# Patient Record
Sex: FEMALE | Race: WHITE | Employment: FULL TIME
[De-identification: names, ages, dates, MRNs, and addresses within clinical notes are randomized per-mention and may not be internally consistent; named-entity substitution may affect disease eponyms.]

---

## 2017-12-03 ENCOUNTER — HEALTH MAINTENANCE LETTER (OUTPATIENT)
Age: 32
End: 2017-12-03

## 2019-05-20 ENCOUNTER — OFFICE VISIT (OUTPATIENT)
Dept: ENDOCRINOLOGY | Facility: CLINIC | Age: 34
End: 2019-05-20
Payer: COMMERCIAL

## 2019-05-20 VITALS
WEIGHT: 182 LBS | TEMPERATURE: 98.5 F | SYSTOLIC BLOOD PRESSURE: 114 MMHG | HEIGHT: 68 IN | BODY MASS INDEX: 27.58 KG/M2 | DIASTOLIC BLOOD PRESSURE: 76 MMHG | RESPIRATION RATE: 20 BRPM | HEART RATE: 76 BPM

## 2019-05-20 DIAGNOSIS — E03.9 HYPOTHYROIDISM, UNSPECIFIED TYPE: Primary | ICD-10-CM

## 2019-05-20 LAB
T4 FREE SERPL-MCNC: 1.08 NG/DL (ref 0.76–1.46)
TSH SERPL DL<=0.005 MIU/L-ACNC: 2.14 MU/L (ref 0.4–4)

## 2019-05-20 PROCEDURE — 99204 OFFICE O/P NEW MOD 45 MIN: CPT | Performed by: INTERNAL MEDICINE

## 2019-05-20 PROCEDURE — 86376 MICROSOMAL ANTIBODY EACH: CPT | Performed by: INTERNAL MEDICINE

## 2019-05-20 PROCEDURE — 36415 COLL VENOUS BLD VENIPUNCTURE: CPT | Performed by: INTERNAL MEDICINE

## 2019-05-20 PROCEDURE — 84439 ASSAY OF FREE THYROXINE: CPT | Performed by: INTERNAL MEDICINE

## 2019-05-20 PROCEDURE — 84443 ASSAY THYROID STIM HORMONE: CPT | Performed by: INTERNAL MEDICINE

## 2019-05-20 ASSESSMENT — MIFFLIN-ST. JEOR: SCORE: 1574.05

## 2019-05-20 NOTE — LETTER
5/20/2019         RE: Linnea M Schirmer  [address] [facility] [address]        Dear Colleague,    Thank you for referring your patient, Linnea M Schirmer, to the [facility]. Please see a copy of my visit note below.    ENDOCRINOLOGY CLINIC NOTE:    Name: Linnea M Schirmer  Self referral for Hypothyroidism.  HPI:  Linnea M Schirmer is a 34 year old female who presents for the evaluation of :    #1 Hypothyroidism.  Was diagnosed with hypothyroidism at age 30.   Per her report it was subclinical hypothyroidism at that time.  But based on her symptoms she was started on levothyroxine.  Had fatigue at that time. Also had iron deficiency at that time.  Reports that iron deficiency has resolved.    After starting levothyroxine she was feeling Ok.  Was pregnant in 2016 and was on levothyroxine during pregnancy.    Off of levothyroxine X 2 years.  ( she reports that she ran out of medication and clinic refused to refill)   last labs checked about 1 year back showed a thyroid Hormones in normal range.    Feeling tired all the time X since 2016 that will be very  Is more sleepy.  Has to take a nap in day.  + hair loss X 1 month.    Palpitations:  No  Changes to hair or skin: Yes: + hair loss  Diarrhea/Constipation:No  Changes in menses: No. Not planning pregnancy.  Dysphagia or Shortness of breath:No  Tremors:No  Difficulty sleeping:No  Changes in weight: + wt gain but trying to loose wt. Lost about 15 lbs since dec 2018- 4/2019  Heat or cold intolerance: + cold intolerance X 2 months  History of Lithium or Amiodarone use:No  Head or neck surgery/radiation:No  IV Contrast: No  Family History of Thyroid Problems: mgm- thyroid. P.aunt- thyroid surgery.  PMH/PSH:  Past Medical History:   Diagnosis Date     Migraines      Past Surgical History:   Procedure Laterality Date     CYSTECTOMY OVARIAN BENIGN       Family Hx:  Family History   Problem Relation Age of Onset     Diabetes Maternal Grandmother       "Diabetes Maternal Grandfather      Diabetes Paternal Grandfather      Social Hx:  Social History     Socioeconomic History     Marital status: Single     Spouse name: Not on file     Number of children: Not on file     Years of education: Not on file     Highest education level: Not on file   Occupational History     Not on file   Social Needs     Financial resource strain: Not on file     Food insecurity:     Worry: Not on file     Inability: Not on file     Transportation needs:     Medical: Not on file     Non-medical: Not on file   Tobacco Use     Smoking status: Never Smoker     Smokeless tobacco: Never Used   Substance and Sexual Activity     Alcohol use: Yes     Comment: occassional     Drug use: No     Sexual activity: Yes     Partners: Male   Lifestyle     Physical activity:     Days per week: Not on file     Minutes per session: Not on file     Stress: Not on file   Relationships     Social connections:     Talks on phone: Not on file     Gets together: Not on file     Attends [employer] service: Not on file     Active member of club or organization: Not on file     Attends meetings of clubs or organizations: Not on file     Relationship status: Not on file     Intimate partner violence:     Fear of current or ex partner: Not on file     Emotionally abused: Not on file     Physically abused: Not on file     Forced sexual activity: Not on file   Other Topics Concern     Not on file   Social History Narrative     Not on file          MEDICATIONS:  has a current medication list which includes the following prescription(s): sumatriptan and levothyroxine sodium.    ROS   ROS: 10 point ROS neg other than the symptoms noted above in the HPI.    Physical Exam   VS: /76   Pulse 76   Temp 98.5  F (36.9  C) (Oral)   Resp 20   Ht 1.727 m (5' 8\")   Wt 82.6 kg (182 lb)   BMI 27.67 kg/m     GENERAL: AXOX3, NAD, well dressed, answering questions appropriately, appears stated age.  HEENT: OP clear, no LAD, no " TM, non-tender, no exopthalmous, no proptosis, EOMI, no lig lag, no retraction  NECK: Thyroid normal in size, non tender, no nodules were palpated.  CV: RRR, no rubs, gallops, no murmurs  LUNGS: CTAB, no wheezes, rales, or ronchi  ABDOMEN: soft, nontender, nondistended, +BS, no organomegaly  EXTREMITIES: no edema, +pulses, no rashes, no lesions  NEUROLOGY: CN grossly intact,  + DTR upper and lower extremity, no tremors  MSK: grossly intact  SKIN: no rashes, no lesions    LABS:  No recent labs to review.    All pertinent notes, labs, and images personally reviewed by me.     A/P  Ms.Linnea M Schirmer is a 34 year old here for the evaluation of hypothyroidism:    #1 Hypothyroidism. Differential includes: autoimmune disease (Hashimoto's thyroiditis), treatment for hyperthyroidism, radiation therapy, thyroid surgery, medications, congenital disease, pituitary disorder, pregnancy, and iodine deficiency.  Persons with Hashimoto's thyroiditis have serum antibodies reacting with TG, TPO, and against an unidentified protein present in colloid.   She is off of levothyroxine for last 2 years  Labs checked 1 year back are in acceptable range per her report  She was started on levothyroxine for subclinical hypothyroidism at age 13  Not planning pregnancy at this time  Has some concerns like chronic fatigue and cold intolerance  Plan:  Discussed diagnosis, pathophysiology, management and treatment options of condition with pt.  I discussed with patient that symptoms like chronic fatigue can be multifactorial  I recommend to repeat thyroid function test and screen for Hashimoto with labs today  Consider thyroid hormone replacement based on that  She is not planning pregnancy at this time but I discussed with patient importance of euthyroidism prior to conception and close monitoring of labs during pregnancy.  Plan: TSH, T4 free, Thyroid peroxidase antibody,                 Standard treatment for hypothyroidism involves daily use  of the synthetic thyroid hormone levothyroxine (Levothroid, Synthroid, others).  The dosage of thyroxine should normally be that required to bring the serum TSH level to the low normal range, such as 0.3 - 1 uU/ml. This is typically achieved with 1 ug L-T4/lb body weight/day, ranges from 75 - 125 ug/day in women, and 125 - 200 ug/day in men. Once thyroxine treatment is initiated, it is required indefinitely in most patients.     Symptoms should improve one to two weeks after starting treatment. Treatment with levothyroxine is usually lifelong.  Doseage may need to be adjusted based on body weight, medications, or pregnancy.  To determine the right dosage of levothyroxine initially we will repeat TSH and free T4 after two months.     Excessive amounts of the hormone can cause side effects, such as: Increased appetite, insomnia, heart palpitations, and shakiness.  Patients with CAD will be started on a lower dose.  Levothyroxine causes virtually no side effects when used in the appropriate dose.    In patients with childbearing age precaution should be taking regarding hypothyroidism. Hypothyroid woman are more likely to experience infertility, and they have an increase. Balance and portion, anemia, and gestational hypertension, placental abruption and postpartum hemorrhage.      Follow-up:  Based on labs.    Katherine Avila MD  Endocrinology  [facility]  CC: Eve Apodaca    More than 50% of face to face time spent with Ms. Schirmer on counseling / coordinating her care.    All questions were answered.  The patient indicates understanding of the above issues and agrees with the plan set forth.     Addendum to above note and clinic visit:    Labs reviewed.    See result note/telephone encounter.            Again, thank you for allowing me to participate in the care of your patient.        Sincerely,        Katherine Avila MD

## 2019-05-20 NOTE — PROGRESS NOTES
ENDOCRINOLOGY CLINIC NOTE:    Name: Linnea M Schirmer  Self referral for Hypothyroidism.  HPI:  Linnea M Schirmer is a 34 year old female who presents for the evaluation of :    #1 Hypothyroidism.  Was diagnosed with hypothyroidism at age 30.   Per her report it was subclinical hypothyroidism at that time.  But based on her symptoms she was started on levothyroxine.  Had fatigue at that time. Also had iron deficiency at that time.  Reports that iron deficiency has resolved.    After starting levothyroxine she was feeling Ok.  Was pregnant in 2016 and was on levothyroxine during pregnancy.    Off of levothyroxine X 2 years.  ( she reports that she ran out of medication and clinic refused to refill)   last labs checked about 1 year back showed a thyroid Hormones in normal range.    Feeling tired all the time X since 2016 that will be very  Is more sleepy.  Has to take a nap in day.  + hair loss X 1 month.    Palpitations:  No  Changes to hair or skin: Yes: + hair loss  Diarrhea/Constipation:No  Changes in menses: No. Not planning pregnancy.  Dysphagia or Shortness of breath:No  Tremors:No  Difficulty sleeping:No  Changes in weight: + wt gain but trying to loose wt. Lost about 15 lbs since dec 2018- 4/2019  Heat or cold intolerance: + cold intolerance X 2 months  History of Lithium or Amiodarone use:No  Head or neck surgery/radiation:No  IV Contrast: No  Family History of Thyroid Problems: mgm- thyroid. P.aunt- thyroid surgery.  PMH/PSH:  Past Medical History:   Diagnosis Date     Migraines      Past Surgical History:   Procedure Laterality Date     CYSTECTOMY OVARIAN BENIGN       Family Hx:  Family History   Problem Relation Age of Onset     Diabetes Maternal Grandmother      Diabetes Maternal Grandfather      Diabetes Paternal Grandfather      Social Hx:  Social History     Socioeconomic History     Marital status: Single     Spouse name: Not on file     Number of children: Not on file     Years of education: Not on  "file     Highest education level: Not on file   Occupational History     Not on file   Social Needs     Financial resource strain: Not on file     Food insecurity:     Worry: Not on file     Inability: Not on file     Transportation needs:     Medical: Not on file     Non-medical: Not on file   Tobacco Use     Smoking status: Never Smoker     Smokeless tobacco: Never Used   Substance and Sexual Activity     Alcohol use: Yes     Comment: occassional     Drug use: No     Sexual activity: Yes     Partners: Male   Lifestyle     Physical activity:     Days per week: Not on file     Minutes per session: Not on file     Stress: Not on file   Relationships     Social connections:     Talks on phone: Not on file     Gets together: Not on file     Attends [employer] service: Not on file     Active member of club or organization: Not on file     Attends meetings of clubs or organizations: Not on file     Relationship status: Not on file     Intimate partner violence:     Fear of current or ex partner: Not on file     Emotionally abused: Not on file     Physically abused: Not on file     Forced sexual activity: Not on file   Other Topics Concern     Not on file   Social History Narrative     Not on file          MEDICATIONS:  has a current medication list which includes the following prescription(s): sumatriptan and levothyroxine sodium.    ROS   ROS: 10 point ROS neg other than the symptoms noted above in the HPI.    Physical Exam   VS: /76   Pulse 76   Temp 98.5  F (36.9  C) (Oral)   Resp 20   Ht 1.727 m (5' 8\")   Wt 82.6 kg (182 lb)   BMI 27.67 kg/m    GENERAL: AXOX3, NAD, well dressed, answering questions appropriately, appears stated age.  HEENT: OP clear, no LAD, no TM, non-tender, no exopthalmous, no proptosis, EOMI, no lig lag, no retraction  NECK: Thyroid normal in size, non tender, no nodules were palpated.  CV: RRR, no rubs, gallops, no murmurs  LUNGS: CTAB, no wheezes, rales, or ronchi  ABDOMEN: soft, " nontender, nondistended, +BS, no organomegaly  EXTREMITIES: no edema, +pulses, no rashes, no lesions  NEUROLOGY: CN grossly intact,  + DTR upper and lower extremity, no tremors  MSK: grossly intact  SKIN: no rashes, no lesions    LABS:  No recent labs to review.    All pertinent notes, labs, and images personally reviewed by me.     A/P  Ms.Linnea M Schirmer is a 34 year old here for the evaluation of hypothyroidism:    #1 Hypothyroidism. Differential includes: autoimmune disease (Hashimoto's thyroiditis), treatment for hyperthyroidism, radiation therapy, thyroid surgery, medications, congenital disease, pituitary disorder, pregnancy, and iodine deficiency.  Persons with Hashimoto's thyroiditis have serum antibodies reacting with TG, TPO, and against an unidentified protein present in colloid.   She is off of levothyroxine for last 2 years  Labs checked 1 year back are in acceptable range per her report  She was started on levothyroxine for subclinical hypothyroidism at age 13  Not planning pregnancy at this time  Has some concerns like chronic fatigue and cold intolerance  Plan:  Discussed diagnosis, pathophysiology, management and treatment options of condition with pt.  I discussed with patient that symptoms like chronic fatigue can be multifactorial  I recommend to repeat thyroid function test and screen for Hashimoto with labs today  Consider thyroid hormone replacement based on that  She is not planning pregnancy at this time but I discussed with patient importance of euthyroidism prior to conception and close monitoring of labs during pregnancy.  Plan: TSH, T4 free, Thyroid peroxidase antibody,                 Standard treatment for hypothyroidism involves daily use of the synthetic thyroid hormone levothyroxine (Levothroid, Synthroid, others).  The dosage of thyroxine should normally be that required to bring the serum TSH level to the low normal range, such as 0.3 - 1 uU/ml. This is typically achieved with  1 ug L-T4/lb body weight/day, ranges from 75 - 125 ug/day in women, and 125 - 200 ug/day in men. Once thyroxine treatment is initiated, it is required indefinitely in most patients.     Symptoms should improve one to two weeks after starting treatment. Treatment with levothyroxine is usually lifelong.  Doseage may need to be adjusted based on body weight, medications, or pregnancy.  To determine the right dosage of levothyroxine initially we will repeat TSH and free T4 after two months.     Excessive amounts of the hormone can cause side effects, such as: Increased appetite, insomnia, heart palpitations, and shakiness.  Patients with CAD will be started on a lower dose.  Levothyroxine causes virtually no side effects when used in the appropriate dose.    In patients with childbearing age precaution should be taking regarding hypothyroidism. Hypothyroid woman are more likely to experience infertility, and they have an increase. Balance and portion, anemia, and gestational hypertension, placental abruption and postpartum hemorrhage.      Follow-up:  Based on labs.    Katherine Avila MD  Endocrinology  [facility]  CC: Eve Apodaca    More than 50% of face to face time spent with Ms. Schirmer on counseling / coordinating her care.    All questions were answered.  The patient indicates understanding of the above issues and agrees with the plan set forth.     Addendum to above note and clinic visit:    Labs reviewed.    See result note/telephone encounter.

## 2019-05-20 NOTE — PATIENT INSTRUCTIONS
[facility]   Dr Avila, Endocrinology Department      [facility]   [address] [facility] [address]  Appointment [phone]  Fax: [phone]   Monday and Tuesday         [place] [address] [place] [address]  Appointment [phone]  Fax: [phone]  Wednesday and Thursday         Labs today.  Consider starting levothroxine after that.

## 2019-05-21 ENCOUNTER — TELEPHONE (OUTPATIENT)
Dept: ENDOCRINOLOGY | Facility: CLINIC | Age: 34
End: 2019-05-21

## 2019-05-21 LAB — THYROPEROXIDASE AB SERPL-ACNC: 10 IU/ML

## 2019-05-21 NOTE — TELEPHONE ENCOUNTER
ENDO THYROID LABS-[facility] Latest Ref Rng & Units 5/20/2019   TSH 0.40 - 4.00 mU/L 2.14   T4 FREE 0.76 - 1.46 ng/dL 1.08   THYR PEROXIDASE RODOLFO <35 IU/mL 10     Thyroid labs are in normal range  Based on that thyroid hormone replacement is not indicated  TPO antibodies are negative. This means that you DO NOT have Hashimots thyroiditis which is associated with underactive thyroid.  Please continue to work with primary care provider for further evaluation of chronic fatigue    Please send a letter.

## 2019-05-21 NOTE — LETTER
[facility]  [address], [address]  [place] [address]  [phone]        May 22, 2019    Linnea M Schirmer  [address] [facility] [address]            Dear Ms. Linnea M Schirmer:      The results of your recent Thyroid Functioning tests were NORMAL.  Based on that, thyroid hormone replacement is not indicated. TPO antibodies are negative. This means that you DO NOT have Hashimots thyroiditis which is associated with underactive thyroid. Please continue to work with primary care provider for further evaluation of chronic fatigue.    If you have any further questions or problems, please contact our office.    Sincerely,    Katherine Avila MD  Endocrinology

## 2019-05-22 NOTE — TELEPHONE ENCOUNTER
Lab results & letter with primary care provider's comments printed and mailed to patient per provider request.

## 2019-07-24 ENCOUNTER — TRANSFERRED RECORDS (OUTPATIENT)
Dept: HEALTH INFORMATION MANAGEMENT | Facility: CLINIC | Age: 34
End: 2019-07-24

## 2019-07-24 LAB
CHOLEST SERPL-MCNC: 276 MG/DL (ref 100–199)
HBA1C MFR BLD: 4.9 % (ref 0–5.7)
HDLC SERPL-MCNC: 53 MG/DL
HPV ABSTRACT: NORMAL
LDLC SERPL CALC-MCNC: 203 MG/DL
NONHDLC SERPL-MCNC: 223 MG/DL
PAP-ABSTRACT: NORMAL
TRIGL SERPL-MCNC: 99 MG/DL

## 2020-03-02 ENCOUNTER — HEALTH MAINTENANCE LETTER (OUTPATIENT)
Age: 35
End: 2020-03-02

## 2020-04-08 ENCOUNTER — PRENATAL OFFICE VISIT (OUTPATIENT)
Dept: NURSING | Facility: CLINIC | Age: 35
End: 2020-04-08
Payer: COMMERCIAL

## 2020-04-08 ENCOUNTER — TRANSCRIBE ORDERS (OUTPATIENT)
Dept: MATERNAL FETAL MEDICINE | Facility: CLINIC | Age: 35
End: 2020-04-08

## 2020-04-08 VITALS — BODY MASS INDEX: 26.07 KG/M2 | HEIGHT: 68 IN | WEIGHT: 172 LBS

## 2020-04-08 DIAGNOSIS — Z34.90 SUPERVISION OF NORMAL PREGNANCY: Primary | ICD-10-CM

## 2020-04-08 DIAGNOSIS — O26.90 PREGNANCY RELATED CONDITION, ANTEPARTUM: Primary | ICD-10-CM

## 2020-04-08 DIAGNOSIS — Z23 NEED FOR TDAP VACCINATION: ICD-10-CM

## 2020-04-08 PROCEDURE — 99207 ZZC NO CHARGE NURSE ONLY: CPT

## 2020-04-08 SDOH — HEALTH STABILITY: MENTAL HEALTH
STRESS IS WHEN SOMEONE FEELS TENSE, NERVOUS, ANXIOUS, OR CAN'T SLEEP AT NIGHT BECAUSE THEIR MIND IS TROUBLED. HOW STRESSED ARE YOU?: NOT AT ALL

## 2020-04-08 ASSESSMENT — MIFFLIN-ST. JEOR: SCORE: 1528.69

## 2020-04-08 NOTE — PROGRESS NOTES
Important Information for Provider:     New ob nurse intake via phone, second pregnancy. History of C section, baby was breech and patient's blood pressure was getting high at 36 weeks. . Gestational hypertension  Patient has history of hypothyroidism, no medication at this time. Handouts reviewed and mailed. Ordered first trimester screening.   Recommended B6, Unisom for nausea, history of migraines. Patient gets restless legs at night, she will try 250 mg magnesium . Ultrasound and NOB with Dr Miner 4/29/2020 Future orders with TSH      Caffeine intake/servings daily - 1  Calcium intake/servings daily - 3  Exercise 5 times weekly - describe ; walks. Precautions given  Sunscreen used - Yes  Seatbelts used - Yes  Guns stored in the home - No  Self Breast Exam - Yes  Pap test up to date -  No  Eye exam up to date -  Yes  Dental exam up to date -  Yes  Immunizations reviewed and up to date - Yes  Abuse: Current or Past (Physical, Sexual or Emotional) -  No  Do you feel safe in your environment - Yes  Do you cope well with stress - Yes  Do you suffer from insomnia - No           Prenatal OB Questionnaire  Patient supplied answers from flow sheet for:  Prenatal OB Questionnaire.  Past Medical History  Diabetes?: No  Hypertension : No  Heart disease, mitral valve prolapse or rheumatic fever?: No  An autoimmune disease such as lupus or rheumatoid arthritis?: No  Kidney disease or urinary tract infection?: No  Epilepsy, seizures or spells?: No  Migraine headaches?: Yes  A stroke or loss of function or sensation?: No  Any other neurological problems?: No  Have you ever been treated for depression?: No  Are you having problems with crying spells or loss of self-esteem?: No  Have you ever required psychiatric care?: No  Have you ever had hepatitis, liver disease or jaundice?: No  Have you been treated for blood clots in your veins, deep vein thromosis, inflammation in the veins, thrombosis, phlebitis, pulmonary embolism or  varicosities?: No  Have you had excessive bleeding after surgery or dental work?: No  Do you bleed more than other women after a cut or scratch?: No  Do you have a history of anemia?: No  Have you ever had thyroid problems or taken thyroid medication?: history of hypothyroidism    Do you have any endocrine problems?: No  Have you ever been in a major accident or suffered serious trauma?: No  Within the last year, has anyone hit, slapped, kicked or otherwise hurt you?: No  In the last year, has anyone forced you to have sex when you didn't want to?: No    Past Medical History 2   Have you ever received a blood transfusion?: No  Would you refuse a blood transfusion if a doctor judged it to be medically necessary?: No   If you answered Yes, would you rather die than receive a blood transfusion?: No  If you answered Yes, is this for Lutheran reasons?: No  Does anyone in your home smoke?: No  Do you use tobacco products?: No  Do you drink beer, wine or hard liquor?: No  Do you use any of the following: marijuana, speed, cocaine, heroin, hallucinogens or other drugs?: No   Is your blood type Rh negative?: No  Have you ever had abnormal antibodies in your blood?: No  Have you ever had asthma?: No  Have you ever had tuberculosis?: No  Do you have any allergies to drugs or over-the-counter medications?: (!) Yes(PCN)  Allergies: Dust Mites, Aspartame, Ethanol, Venlafaxine, Hydrochloride, Sertraline: No  Have you had any breast problems?: No  Have you ever ?: (!) Yes  Have you had any gynecological surgical procedures such as cervical conization, a LEEP procedure, laser treatment, cryosurgery of the cervix or a dilation and curettage, etc?: No  Have you ever had any other surgical procedures?: C section cystectomy  Have you been hospitalized for a nonsurgical reason excluding normal delivery?: No  Have you ever had any anesthetic complications?: No  Have you ever had an abnormal pap smear?: No    Past Medical History  (Continued)  Do you have a history of abnormalities of the uterus?: No  Did your mother take ADELAIDE or any other hormones when she was pregnant with you?: No  Did it take you more than a year to become pregnant?: No  Have you ever been evaluated or treated for infertility?: No  Is there a history of medical problems in your family, which you feel may be important to this pregnancy?: No  Do you have any other problems we have not asked about which you feel may be important to this pregnancy?: No    Symptoms since last menstrual period  Do you have any of the following symptoms: abdominal pain, blood in stools or urine, chest pain, shortness of breath, coughing or vomiting up blood, your heart racing or skipping beats, nausea and vomiting, pain on urination or vaginal discharge or bleed: (!) Yes  Will the patient be 35 years old or older at the time of delivery?: No    Has the patient, baby's father or anyone in either family had:  Thalassemia (Italian, Greek, Mediterranean or  background only) and an MCV result less than 80?: No  Neural tube defect such as meningomyelocele, spina bifida or anencephaly?: No  Congenital heart defect?: No  Down's Syndrome?: No  Ray-Sachs disease (Mandaeism, Cajun, Portuguese-[place])?: No  Sickle cell disease or trait ()?: No  Hemophilia or other inherited problems of blood?: No  Muscular dystrophy?: No  Cystic fibrosis?: No  Jessie's chorea?: No  Mental retardation/autism?: No  If yes, was the person tested for fragile X?: No  Any other inherited genetic or chromosomal disorder?: No  Maternal metabolic disorder (e.g Insulin-dependent diabetes, PKU)?: No  A child with birth defects not listed above?: No  Recurrent pregnancy loss or stillbirth?: No   Has the patient had any medications/street drugs/alcohol since her last menstrual period?: No  Does the patient or baby's father have any other genetic risks?: No    Infection History   Do you object to being tested for Hepatitis B?:  No  Do you object to being tested for HIV?: No   Do you feel that you are at high risk for coming in contact with the AIDS virus?: No  Have you ever been treated for tuberculosis?: No  Have you ever had a positive skin test for tuberculosis?: No  Do you live with someone who has tuberculosis?: No  Have you ever been exposed to tuberculosis?: No  Do you have genital herpes?: No  Does your partner have genital herpes?: No  Have you had a viral illness since your last period?: No  Have you ever had gonorrhea, chlamydia, syphilis, venereal warts, trichomoniasis, pelvic inflammatory disease or any other sexually transmitted disease?: No  Do you know if you are a genital group B streptococcus carrier?: No  Have you had chicken pox/varicella?: (!) Yes   Have you been vaccinated against chicken Pox?: No  Have you had any other infectious diseases?: shingles      Allergies as of 4/8/2020:    Allergies as of 04/08/2020 - Reviewed 05/20/2019   Allergen Reaction Noted     Penicillins  10/30/2013       Current medications are:  Current Outpatient Medications   Medication Sig Dispense Refill     SUMATRIPTAN NA            Early ultrasound screening tool:    Does patient have irregular periods?  No  Did patient use hormonal birth control in the three months prior to positive urine pregnancy test? No  Is the patient breastfeeding?  No  Is the patient 10 weeks or greater at time of education visit?  No

## 2020-04-17 ENCOUNTER — TELEPHONE (OUTPATIENT)
Dept: OBGYN | Facility: CLINIC | Age: 35
End: 2020-04-17

## 2020-04-17 NOTE — TELEPHONE ENCOUNTER
----- Message from Lynn Miner MD sent at 4/17/2020  2:45 PM CDT -----  Regarding: francie Faith,    Just wanted to let you know Simin is still scheduling past 4, but maybe this was scheduled prior to the templates begin done?  Either way, this patient will need to be rescheduled to earlier in the day as will the patient scheduled at 5.  I assume you haven't gotten to this week yet, but just a heads up.      Thanks,  Fabby

## 2020-04-17 NOTE — TELEPHONE ENCOUNTER
Called and left message to call clinic back.   See message below, appointment needs to be rescheduled due to clinic hours.   Vianney Santana,

## 2020-04-28 ENCOUNTER — VIRTUAL VISIT (OUTPATIENT)
Dept: MATERNAL FETAL MEDICINE | Facility: CLINIC | Age: 35
End: 2020-04-28
Attending: OBSTETRICS & GYNECOLOGY
Payer: COMMERCIAL

## 2020-04-28 DIAGNOSIS — O26.90 PREGNANCY RELATED CONDITION, ANTEPARTUM: ICD-10-CM

## 2020-04-28 DIAGNOSIS — O09.521 SUPERVISION OF ELDERLY MULTIGRAVIDA IN FIRST TRIMESTER: Primary | ICD-10-CM

## 2020-04-28 PROCEDURE — 40000072 ZZH STATISTIC GENETIC COUNSELING, < 16 MIN: Mod: TEL,ZF | Performed by: GENETIC COUNSELOR, MS

## 2020-04-28 NOTE — PROGRESS NOTES
[facility] [place]  Genetic Counseling Consult    Patient: Linnea M Schirmer YOB: 1985   Date of Service: 20        Jennifer was evaluated via a billable telephone visit at [facility] [place] for genetic consultation given advanced maternal age.    The patient has been notified of the following:  This telephone visit will be conducted via a call between you and your physician/provider. We have found that certain health care needs can be provided without the need for a physical exam. This service lets us provide the care you need with a short phone conversation. If a prescription is necessary we can send it directly to your pharmacy. If lab work is needed we can place an order for that and you can then stop by our lab to have the test done at a later time.     If during the course of the call the provider feels a telephone visit is not appropriate, you will not be charged for this service.          Impression/Plan:   1.  Jennifer had a genetic counseling session only and plans for a blood draw for NIPT (Innatal test through [email or web address] lab) after her viability/dating ultrasound and new OB appointment this coming Thursday.  Results are expected within 7-10 days from her blood draw, and will be available in [employer]  We will contact her to discuss the results, and a copy will be forwarded to the office of the referring OB provider.  Jennifer will be contacted at the phone number she provided, [phone], and requests that detailed results be left in her voicemail if she cannot be reached.  Jennifer opted out of sex chromosome aneuploidy testing.      2.  Maternal serum AFP (single marker screen) is recommended after 15 weeks to screen for open neural tube defects. A quad screen should not be performed.    3.  An 18-20 week comprehensive ultrasound is standard of care for all women 35 or older at delivery.    Pregnancy History:   /Parity:     Age at Delivery: 35 year old  ARABELLA: 2020, by Last Menstrual Period  Gestational Age: 10w4d    No significant complications or exposures were reported in the current pregnancy.    Jennifer caruso pregnancy history is significant for 1 prior full term pregnancy with no reported complications.    Medical History:   Jennifer caruso reported medical history is not expected to impact pregnancy management or risks to fetal development.       Family History:   A three-generation pedigree was obtained, and is scanned under the  Media  tab.   The reported family history is negative for multiple miscarriages, stillbirths, birth defects, cognitive impairment, known genetic conditions, and consanguinity.       Carrier Screening:   The patient reports that she and the father of the pregnancy have  ancestry:      Cystic fibrosis is an autosomal recessive genetic condition that occurs with increased frequency in individuals of  ancestry and carrier screening for this condition is available.  In addition,  screening in the [facility] includes cystic fibrosis.        Expanded carrier screening for mutations in a large panel of genes associated with autosomal recessive conditions including cystic fibrosis, spinal muscular atrophy, and others, is now available.      The patient has had previous carrier screening for cystic fibrosis, the results of which were negative.  A copy of the report was available for our review today.       Risk Assessment for Chromosome Conditions:   We explained that the risk for fetal chromosome abnormalities increases with maternal age. We discussed specific features of common chromosome abnormalities, including Down syndrome, trisomy 13, trisomy 18, and sex chromosome trisomies.      - At age 35 at midtrimester, the risk to have a baby with Down syndrome is 1 in 274.     - At age 35 at midtrimester, the risk to have a baby with any chromosome abnormality is 1 in 135.           Testing Options:   We discussed the following options:   First trimester screening    First trimester ultrasound with nuchal translucency and nasal bone assessments, maternal plasma hCG, ELISE-A, and AFP measurement    Screens for fetal trisomy 21, trisomy 13, and trisomy 18    Cannot screen for open neural tube defects; maternal serum AFP after 15 weeks is recommended       Non-invasive Prenatal Testing (NIPT)    Maternal plasma cell-free DNA testing; first trimester ultrasound with nuchal translucency and nasal bone assessment is recommended, when appropriate    Screens for fetal trisomy 21, trisomy 13, trisomy 18, and sex chromosome aneuploidy    Cannot screen for open neural tube defects; maternal serum AFP after 15 weeks is recommended       Genetic Amniocentesis    Invasive procedure typically performed in the second trimester by which amniotic fluid is obtained for the purpose of chromosome analysis and/or other prenatal genetic analysis    Diagnostic results; >99% sensitivity for fetal chromosome abnormalities    AFAFP measurement tests for open neural tube defects       Comprehensive (Level II) ultrasound: Detailed ultrasound performed between 18-22 weeks gestation to screen for major birth defects and markers for aneuploidy.        We reviewed the benefits and limitations of this testing.  Screening tests provide a risk assessment specific to the pregnancy for certain fetal chromosome abnormalities, but cannot definitively diagnose or exclude a fetal chromosome abnormality.  Follow-up genetic counseling and consideration of diagnostic testing is recommended with any abnormal screening result.     Diagnostic tests carry inherent risks- including risk of miscarriage- that require careful consideration.  These tests can detect fetal chromosome abnormalities with greater than 99% certainty.  Results can be compromised by maternal cell contamination or mosaicism, and are limited by the resolution of  cytogenetic G-banding technology.  There is no screening nor diagnostic test that can detect all forms of birth defects or mental disability.     It was a pleasure to be involved with Jennifer s care.     Call duration 35 minutes  Call start 1:36  Call end 2:11    Eric Rockwell MS, [facility]  Licensed Genetic Counselor  Phone: [phone]  Pager: [phone]

## 2020-04-30 ENCOUNTER — ANCILLARY PROCEDURE (OUTPATIENT)
Dept: ULTRASOUND IMAGING | Facility: CLINIC | Age: 35
End: 2020-04-30
Attending: OBSTETRICS & GYNECOLOGY
Payer: COMMERCIAL

## 2020-04-30 ENCOUNTER — PRENATAL OFFICE VISIT (OUTPATIENT)
Dept: OBGYN | Facility: CLINIC | Age: 35
End: 2020-04-30
Attending: OBSTETRICS & GYNECOLOGY
Payer: COMMERCIAL

## 2020-04-30 VITALS
HEART RATE: 84 BPM | SYSTOLIC BLOOD PRESSURE: 116 MMHG | WEIGHT: 171.9 LBS | DIASTOLIC BLOOD PRESSURE: 82 MMHG | BODY MASS INDEX: 26.14 KG/M2

## 2020-04-30 DIAGNOSIS — O09.521 SUPERVISION OF ELDERLY MULTIGRAVIDA IN FIRST TRIMESTER: ICD-10-CM

## 2020-04-30 DIAGNOSIS — O09.521 SUPERVISION OF ELDERLY MULTIGRAVIDA IN FIRST TRIMESTER: Primary | ICD-10-CM

## 2020-04-30 DIAGNOSIS — O09.521 MULTIGRAVIDA OF ADVANCED MATERNAL AGE IN FIRST TRIMESTER: Primary | ICD-10-CM

## 2020-04-30 DIAGNOSIS — Z34.81 ENCOUNTER FOR SUPERVISION OF OTHER NORMAL PREGNANCY IN FIRST TRIMESTER: ICD-10-CM

## 2020-04-30 DIAGNOSIS — O34.219 HISTORY OF CESAREAN DELIVERY AFFECTING PREGNANCY: ICD-10-CM

## 2020-04-30 DIAGNOSIS — Z34.90 SUPERVISION OF NORMAL PREGNANCY: ICD-10-CM

## 2020-04-30 LAB
ABO + RH BLD: NORMAL
ABO + RH BLD: NORMAL
BLD GP AB SCN SERPL QL: NORMAL
BLOOD BANK CMNT PATIENT-IMP: NORMAL
ERYTHROCYTE [DISTWIDTH] IN BLOOD BY AUTOMATED COUNT: 12.3 % (ref 10–15)
HCT VFR BLD AUTO: 40.8 % (ref 35–47)
HGB BLD-MCNC: 14.1 G/DL (ref 11.7–15.7)
MCH RBC QN AUTO: 29.1 PG (ref 26.5–33)
MCHC RBC AUTO-ENTMCNC: 34.6 G/DL (ref 31.5–36.5)
MCV RBC AUTO: 84 FL (ref 78–100)
PLATELET # BLD AUTO: 265 10E9/L (ref 150–450)
RBC # BLD AUTO: 4.85 10E12/L (ref 3.8–5.2)
SPECIMEN EXP DATE BLD: NORMAL
TSH SERPL DL<=0.005 MIU/L-ACNC: 1.58 MU/L (ref 0.4–4)
WBC # BLD AUTO: 7.6 10E9/L (ref 4–11)

## 2020-04-30 PROCEDURE — 86780 TREPONEMA PALLIDUM: CPT | Performed by: OBSTETRICS & GYNECOLOGY

## 2020-04-30 PROCEDURE — 99207 ZZC FIRST OB VISIT: CPT | Performed by: OBSTETRICS & GYNECOLOGY

## 2020-04-30 PROCEDURE — 40000791 ZZHCL STATISTIC VERIFI PRENATAL TRISOMY 21,18,13: Mod: 90 | Performed by: OBSTETRICS & GYNECOLOGY

## 2020-04-30 PROCEDURE — 76801 OB US < 14 WKS SINGLE FETUS: CPT | Performed by: OBSTETRICS & GYNECOLOGY

## 2020-04-30 PROCEDURE — 86850 RBC ANTIBODY SCREEN: CPT | Performed by: OBSTETRICS & GYNECOLOGY

## 2020-04-30 PROCEDURE — 85027 COMPLETE CBC AUTOMATED: CPT | Performed by: OBSTETRICS & GYNECOLOGY

## 2020-04-30 PROCEDURE — 36415 COLL VENOUS BLD VENIPUNCTURE: CPT | Performed by: OBSTETRICS & GYNECOLOGY

## 2020-04-30 PROCEDURE — 86900 BLOOD TYPING SEROLOGIC ABO: CPT | Performed by: OBSTETRICS & GYNECOLOGY

## 2020-04-30 PROCEDURE — 87389 HIV-1 AG W/HIV-1&-2 AB AG IA: CPT | Performed by: OBSTETRICS & GYNECOLOGY

## 2020-04-30 PROCEDURE — 82306 VITAMIN D 25 HYDROXY: CPT | Performed by: OBSTETRICS & GYNECOLOGY

## 2020-04-30 PROCEDURE — 99000 SPECIMEN HANDLING OFFICE-LAB: CPT | Performed by: OBSTETRICS & GYNECOLOGY

## 2020-04-30 PROCEDURE — 86762 RUBELLA ANTIBODY: CPT | Performed by: OBSTETRICS & GYNECOLOGY

## 2020-04-30 PROCEDURE — 86901 BLOOD TYPING SEROLOGIC RH(D): CPT | Performed by: OBSTETRICS & GYNECOLOGY

## 2020-04-30 PROCEDURE — 84443 ASSAY THYROID STIM HORMONE: CPT | Performed by: OBSTETRICS & GYNECOLOGY

## 2020-04-30 PROCEDURE — 87340 HEPATITIS B SURFACE AG IA: CPT | Performed by: OBSTETRICS & GYNECOLOGY

## 2020-04-30 RX ORDER — PRENATAL VIT/IRON FUM/FOLIC AC 27MG-0.8MG
1 TABLET ORAL DAILY
COMMUNITY
End: 2022-03-16

## 2020-04-30 RX ORDER — MULTIVITAMIN WITH IRON
1 TABLET ORAL DAILY
COMMUNITY

## 2020-04-30 NOTE — PROGRESS NOTES
OB - New OB History and Physical  Date of visit: 2020  Chief Complaint: To establish prenatal care    HPI: Linnea M Schirmer is a 35 year old  at 10w6d as dated by LMP consistent with today's US at 11 weeks.  Estimated Date of Delivery: 2020  Since becoming pregnant, patient reports feeling tired but overall okay. No bleeding or pain.      Ultrasound: 2020 Toney intrauterine pregnancy, 11w 0d, with ARABELLA by today's ultrasound 2020 (consistent with the stated LMP ARABELLA).  Fetal cardiac activity is present, 167 BPM.     Obstetric history:     OB History    Para Term  AB Living   2 1 1 0 0 1   SAB TAB Ectopic Multiple Live Births   0 0 0 0 1      # Outcome Date GA Lbr Jose/2nd Weight Sex Delivery Anes PTL Lv   2 Current            1 Term 08/15/16 37w5d  3.07 kg (6 lb 12.3 oz) F -SEC   AMBER   Had a CS for breech. Had high blood pressure.   Patient denies gestational diabetes,  labor, postpartum hemorrhage.    Gynecologic History:   Patient's last menstrual period was 2020 (lmp unknown).   STI history: none  Last Pap: 19 NIL at [facility]  History of abnormal pap: no    Allergy: Penicillins - HIVES  Patient denies food, latex or environmental allergies.     Current Medications:  Current Outpatient Medications   Medication     magnesium 250 MG tablet     Prenatal Vit-Fe Fumarate-FA (PRENATAL MULTIVITAMIN W/IRON) 27-0.8 MG tablet     No current facility-administered medications for this visit.        Past Medical History:  Past Medical History:   Diagnosis Date     Migraines        Past Surgical History:  Past Surgical History:   Procedure Laterality Date     CYSTECTOMY OVARIAN BENIGN         Social History:  Patient lives with , 4yo daughter and another family in her basement.  Patient's relationship status is: .    Works as a .   Denies current tobacco, alcohol or recreational drug use.   She feels safe in her  relationship. Patient denies history of sexual, physical or mental abuse.     Family History:  Family History   Problem Relation Age of Onset     Atrial fibrillation Mother      Coronary Artery Disease Father      Diabetes Maternal Grandfather      Diabetes Paternal Grandfather        Review of Systems  Gen:  no change in weight, no fever, no chills, no fatigue  CV: no palpitations, no chest pain, no hypertension, no syncope  Resp: no shortness of breath, no cough, no wheezing, no asthma  GI: no nausea, no vomiting, no diarrhea, no constipation, no bloating, no GERD  :  no vaginal discharge, no dysuria, no abnormal bleeding, no pelvic pain   Endo: no thyroid problems, no cold/heat intolerance, no acne, no hirsutism, no diabetes  Heme: no easy bruising or bleeding, no history of DVT/PE/CVA  Neuro: no headaches, no seizures, no strokes, no focal deficits      Physical Exam:  Vitals:    20 1439   BP: 116/82   Pulse: 84   Weight: 78 kg (171 lb 14.4 oz)     Body mass index is 26.14 kg/m .  Gen: alert, oriented, no distress, very pleasant, appears stated age, well groomed  Neck: supple, trachea midline, no thyromegaly, no lymphadenopathy  HEENT: head normocephalic, atraumatic, normal oropharynx without erythema or exudates  CV: normal heart sounds, regular rate and rhythm, no murmurs  Resp: good inspiratory effort, lungs clear to ascultation bilaterally, no wheezes or rhonchi  Abd: soft, , nontender, nondistended, normoactive bowel sounds,  no masses or organomegaly, no hernias, well healed Pfannenstiel scar  : deferred  Extr: warm, well perfused, nontender, no edema  Psych: affect bright, cooperative, responds appropriately      Assessment:  Linnea M Schirmer is a 35 year old  at 10w6d presenting to establish prenatal care.    Problem List:   AMA  H/o CS  H/o gestational htn  H/o anemia    Plan:  1. AMA: having NIPT and level 2 US  2. H/o CS: discussed TOLAC, patient leaning towards repeat CS  3. H/o  gest htn: recommend ASA 81mg daily starting in second trimester  4. H/o anemia: advised her to continue iron supplement daily in addition to prenatal vitamin  5. Reviewed routine prenatal care. Discussed MD call schedule as well as role of residents and med students both in clinic and hospital.  She is okay  with resident care  6. Pap: up to date   7. Diet, Nutrition and Exercise:  Continue PNVs. Continue normal exercise. Her prepregnancy BMI is 26.  According to the WHO guidelines, patient is given a goal of gaining approximately 15-25 pounds during the course of her pregnancy.    8. Immunizations: plan TdaP at 28 weeks  9. Fetal anomaly screening: had NIPT drawn. Plan level 2 US for AMA.   10. Routine Prenatal Care: the patient will return to clinic in 4 weeks and prn    Georgina Nunez MD

## 2020-04-30 NOTE — Clinical Note
Pap smear done on this date: 7/24/19 (approximately), by this group: [facility], results were  NIL.

## 2020-04-30 NOTE — NURSING NOTE
"Chief Complaint   Patient presents with     Prenatal Care     10+6       Initial /82   Pulse 84   Wt 78 kg (171 lb 14.4 oz)   LMP 2020 (LMP Unknown)   BMI 26.14 kg/m   Estimated body mass index is 26.14 kg/m  as calculated from the following:    Height as of 20: 1.727 m (5' 8\").    Weight as of this encounter: 78 kg (171 lb 14.4 oz).  BP completed using cuff size: regular    Questioned patient about current smoking habits.  Pt. has never smoked.          The following HM Due: pap smear      The following patient reported/Care Every where data was sent to:  P ABSTRACT QUALITY INITIATIVES [23417]  Pap smear done on this date: 19 (approximately), by this group: [facility], results were  NIL.       patient has appointment for today  Venus Morgan                "

## 2020-05-01 PROBLEM — O26.899 RH NEGATIVE STATE IN ANTEPARTUM PERIOD: Status: ACTIVE | Noted: 2020-05-01

## 2020-05-01 PROBLEM — Z67.91 RH NEGATIVE STATE IN ANTEPARTUM PERIOD: Status: ACTIVE | Noted: 2020-05-01

## 2020-05-01 LAB
DEPRECATED CALCIDIOL+CALCIFEROL SERPL-MC: 32 UG/L (ref 20–75)
HBV SURFACE AG SERPL QL IA: NONREACTIVE
HIV 1+2 AB+HIV1 P24 AG SERPL QL IA: NONREACTIVE
RUBV IGG SERPL IA-ACNC: 68 IU/ML
T PALLIDUM AB SER QL: NONREACTIVE

## 2020-05-08 ENCOUNTER — TELEPHONE (OUTPATIENT)
Dept: MATERNAL FETAL MEDICINE | Facility: CLINIC | Age: 35
End: 2020-05-08

## 2020-05-08 LAB — LAB SCANNED RESULT: NORMAL

## 2020-05-08 NOTE — TELEPHONE ENCOUNTER
5/8/2020       Called Jennifer to discuss NIPT results.  Results came back negative for chromosome abnormalities in chromosomes 21, 18, & 13.  These test results do not definitively rule out the possibility of one of these conditions, but they do greatly reduce the likelihood.   This information was left in Jennifer's voicemail as requested and Jennifer was encouraged to reach out if she has any questions or concerns in the future.       Eric Rockwell MS, [facility]  Licensed Genetic Counselor  Phone: [phone]  Pager: [phone]

## 2020-06-01 ENCOUNTER — PRENATAL OFFICE VISIT (OUTPATIENT)
Dept: OBGYN | Facility: CLINIC | Age: 35
End: 2020-06-01
Payer: COMMERCIAL

## 2020-06-01 DIAGNOSIS — M25.559 ARTHRALGIA OF HIP, UNSPECIFIED LATERALITY: ICD-10-CM

## 2020-06-01 DIAGNOSIS — O09.90 SUPERVISION OF HIGH RISK PREGNANCY, ANTEPARTUM: Primary | ICD-10-CM

## 2020-06-01 PROCEDURE — 99207 ZZC PRENATAL VISIT: CPT | Performed by: OBSTETRICS & GYNECOLOGY

## 2020-06-01 NOTE — PROGRESS NOTES
Phone visit for modified prenatal care for COVID.  Had NIPT, will start baby ASA.  Trouble sleeping, doesn't feel well rested.  Using Unisom, may want to try intermittent use.  Having hip pain, will refer to PT.  Wants AFP, ordered, she will do at next visit (as well as U/C, didn't do initially) in 4 weeks when she has her MFM ultrasound at [place].  RTC 4 weeks.  AM.

## 2020-06-17 ENCOUNTER — MYC MEDICAL ADVICE (OUTPATIENT)
Dept: OBGYN | Facility: CLINIC | Age: 35
End: 2020-06-17

## 2020-06-18 NOTE — TELEPHONE ENCOUNTER
Providers,    Please see message below from OB provider.    Thanks  Kristen Gregorio RN   [facility]

## 2020-06-18 NOTE — TELEPHONE ENCOUNTER
"#1 should check magnesium level  # 2 from [place]    \" Many people don't get enough magnesium in their diets. Before you reach for a supplement, though, you should know that just a few servings of magnesium-rich foods a day can meet your need for this important nutrient.    Nuts, seeds, whole grains, beans, leafy vegetables, milk, yogurt and fortified foods are good sources. Just 1 ounce of almonds or cashews contains 20% of the daily magnesium an adult needs. Even water (tap, mineral or bottled) can provide magnesium. Some laxatives and antacids also contain magnesium.    Why is magnesium important? Magnesium plays many crucial roles in the body, such as supporting muscle and nerve function and energy production.    Low magnesium levels don't cause symptoms in the short term. However, chronically low levels can increase the risk of high blood pressure, heart disease, type 2 diabetes and osteoporosis.    Too much magnesium from foods isn't a concern for healthy adults. However, the same can't be said for supplements. High doses of magnesium from supplements or medications can cause nausea, abdominal cramping and diarrhea.    In addition, the magnesium in supplements can interact with some types of antibiotics and other medicines. Check with your doctor or pharmacist if you're considering magnesium supplements, especially if you routinely use magnesium-containing antacids or laxatives.\"    #3  Typical daily dose form OTC is 320 mg  "

## 2020-06-18 NOTE — TELEPHONE ENCOUNTER
Please see dINKhart message regarding taking magnesium and dosing. Still having issues with sleep/restless legs. Alisha Minor RN

## 2020-06-18 NOTE — TELEPHONE ENCOUNTER
I'm not sure--could you forward this question to one of the FP providers who is there today?  They probably have a better idea re treatment of restless leg etc.

## 2020-07-07 ENCOUNTER — PRE VISIT (OUTPATIENT)
Dept: MATERNAL FETAL MEDICINE | Facility: CLINIC | Age: 35
End: 2020-07-07

## 2020-07-09 ENCOUNTER — HOSPITAL ENCOUNTER (OUTPATIENT)
Dept: ULTRASOUND IMAGING | Facility: CLINIC | Age: 35
End: 2020-07-09
Attending: OBSTETRICS & GYNECOLOGY
Payer: COMMERCIAL

## 2020-07-09 ENCOUNTER — OFFICE VISIT (OUTPATIENT)
Dept: MATERNAL FETAL MEDICINE | Facility: CLINIC | Age: 35
End: 2020-07-09
Attending: OBSTETRICS & GYNECOLOGY
Payer: COMMERCIAL

## 2020-07-09 ENCOUNTER — PRENATAL OFFICE VISIT (OUTPATIENT)
Dept: OBGYN | Facility: CLINIC | Age: 35
End: 2020-07-09
Payer: COMMERCIAL

## 2020-07-09 VITALS
BODY MASS INDEX: 27.16 KG/M2 | WEIGHT: 178.6 LBS | DIASTOLIC BLOOD PRESSURE: 80 MMHG | TEMPERATURE: 94 F | SYSTOLIC BLOOD PRESSURE: 127 MMHG

## 2020-07-09 DIAGNOSIS — O09.522 MULTIGRAVIDA OF ADVANCED MATERNAL AGE IN SECOND TRIMESTER: Primary | ICD-10-CM

## 2020-07-09 DIAGNOSIS — Z34.81 ENCOUNTER FOR SUPERVISION OF OTHER NORMAL PREGNANCY IN FIRST TRIMESTER: ICD-10-CM

## 2020-07-09 DIAGNOSIS — O09.521 MULTIGRAVIDA OF ADVANCED MATERNAL AGE IN FIRST TRIMESTER: ICD-10-CM

## 2020-07-09 DIAGNOSIS — O34.219 HISTORY OF CESAREAN DELIVERY AFFECTING PREGNANCY: ICD-10-CM

## 2020-07-09 DIAGNOSIS — O09.521 SUPERVISION OF ELDERLY MULTIGRAVIDA IN FIRST TRIMESTER: ICD-10-CM

## 2020-07-09 DIAGNOSIS — O09.90 SUPERVISION OF HIGH RISK PREGNANCY, ANTEPARTUM: ICD-10-CM

## 2020-07-09 DIAGNOSIS — O26.90 PREGNANCY RELATED CONDITION, ANTEPARTUM: ICD-10-CM

## 2020-07-09 LAB
ALBUMIN UR-MCNC: NEGATIVE MG/DL
APPEARANCE UR: CLEAR
BILIRUB UR QL STRIP: NEGATIVE
COLOR UR AUTO: YELLOW
GLUCOSE UR STRIP-MCNC: NEGATIVE MG/DL
HGB UR QL STRIP: NEGATIVE
KETONES UR STRIP-MCNC: NEGATIVE MG/DL
LEUKOCYTE ESTERASE UR QL STRIP: NEGATIVE
NITRATE UR QL: NEGATIVE
PH UR STRIP: 7 PH (ref 5–7)
SOURCE: NORMAL
SP GR UR STRIP: 1.01 (ref 1–1.03)
UROBILINOGEN UR STRIP-ACNC: 0.2 EU/DL (ref 0.2–1)

## 2020-07-09 PROCEDURE — 36415 COLL VENOUS BLD VENIPUNCTURE: CPT | Performed by: OBSTETRICS & GYNECOLOGY

## 2020-07-09 PROCEDURE — 99207 ZZC PRENATAL VISIT: CPT | Performed by: OBSTETRICS & GYNECOLOGY

## 2020-07-09 PROCEDURE — 76811 OB US DETAILED SNGL FETUS: CPT

## 2020-07-09 PROCEDURE — 87086 URINE CULTURE/COLONY COUNT: CPT | Performed by: OBSTETRICS & GYNECOLOGY

## 2020-07-09 PROCEDURE — 99000 SPECIMEN HANDLING OFFICE-LAB: CPT | Performed by: OBSTETRICS & GYNECOLOGY

## 2020-07-09 PROCEDURE — 82105 ALPHA-FETOPROTEIN SERUM: CPT | Mod: 90 | Performed by: OBSTETRICS & GYNECOLOGY

## 2020-07-09 PROCEDURE — 81003 URINALYSIS AUTO W/O SCOPE: CPT | Performed by: OBSTETRICS & GYNECOLOGY

## 2020-07-09 NOTE — PROGRESS NOTES
"Please see \"Imaging\" tab under \"Chart Review\" for details of today's US at the [facility].    Braxton Freitas MD  Maternal-Fetal Medicine      "

## 2020-07-09 NOTE — PROGRESS NOTES
20w6d  Feeling good, no c/o.  Feeling some mvmt.  Had level 2, reports normal.  Gender unknown.  AFP and urine culture today. Plan phone visit in 4 weeks, in office in 8 with tha and tdap.  amy

## 2020-07-10 LAB
BACTERIA SPEC CULT: NORMAL
SPECIMEN SOURCE: NORMAL

## 2020-07-12 LAB
# FETUSES US: NORMAL
# FETUSES: NORMAL
AFP ADJ MOM AMN: 0.92
AFP SERPL-MCNC: 53 NG/ML
AGE - REPORTED: 35.6 YR
CURRENT SMOKER: NO
CURRENT SMOKER: NO
DIABETES STATUS PATIENT: NO
FAMILY MEMBER DISEASES HX: NO
FAMILY MEMBER DISEASES HX: NO
GA METHOD: NORMAL
GA METHOD: NORMAL
GA: NORMAL WK
IDDM PATIENT QL: NO
INTEGRATED SCN PATIENT-IMP: NORMAL
LMP START DATE: NORMAL
MONOCHORIONIC TWINS: NO
SERVICE CMNT-IMP: NO
SPECIMEN DRAWN SERPL: NORMAL
VALPROIC/CARBAMAZEPINE STATUS: NORMAL
WEIGHT UNITS: NORMAL

## 2020-08-06 ENCOUNTER — PRENATAL OFFICE VISIT (OUTPATIENT)
Dept: OBGYN | Facility: CLINIC | Age: 35
End: 2020-08-06
Payer: COMMERCIAL

## 2020-08-06 DIAGNOSIS — O26.899 RH NEGATIVE STATE IN ANTEPARTUM PERIOD: ICD-10-CM

## 2020-08-06 DIAGNOSIS — Z67.91 RH NEGATIVE STATE IN ANTEPARTUM PERIOD: ICD-10-CM

## 2020-08-06 DIAGNOSIS — Z98.891 H/O CESAREAN SECTION: Primary | ICD-10-CM

## 2020-08-06 DIAGNOSIS — O09.90 SUPERVISION OF HIGH RISK PREGNANCY, ANTEPARTUM: ICD-10-CM

## 2020-08-06 PROCEDURE — 99207 ZZC PRENATAL VISIT: CPT | Performed by: OBSTETRICS & GYNECOLOGY

## 2020-08-06 NOTE — PROGRESS NOTES
Phone visit for modified prenatal care for COVID.  Call time 15 minutes  U/C, AFP normal.  MFM ultrasound was reassuring, further ultrasounds as clinically indicated.  Has lower back pain, sciatic pain.  Saw chiropractor, pain improved. Didn't see PT, might try maternity belt.  Discussed timing of RCS (she has decided she wishes this mode of delivery), will schedule at 39 weeks.  Will send chart to scheduling.  Discussed history of gestational hypertension, her  is a  and can monitor her blood pressure at home, they may wish to begin this.  Tdap, one hour glucose next.  Office visit in 4 weeks.  AM

## 2020-08-07 ENCOUNTER — TELEPHONE (OUTPATIENT)
Dept: OBGYN | Facility: CLINIC | Age: 35
End: 2020-08-07

## 2020-08-07 NOTE — TELEPHONE ENCOUNTER
Called to schedule RCS. 39 weeks on 11/13. Patient would like to have it done on 11/12 instead. Told patient I would check to see if this is okay; please advise.  Vianney Santana, Surgery Coordinator

## 2020-08-10 DIAGNOSIS — Z11.59 ENCOUNTER FOR SCREENING FOR OTHER VIRAL DISEASES: Primary | ICD-10-CM

## 2020-08-10 PROBLEM — Z98.891 H/O CESAREAN SECTION: Status: ACTIVE | Noted: 2020-08-10

## 2020-08-10 NOTE — TELEPHONE ENCOUNTER
Type of surgery: ob  Location of surgery: [facility]  Date and time of surgery: 11/13/20 830a  Surgeon: Sahil  Pre-Op Appt Date: surgeon to do   Post-Op Appt Date: 6 weeks   Packet sent out: Yes  Pre-cert/Authorization completed:  No  Date: 08/10/20  Vianney Santana, Surgery Coordinator

## 2020-08-10 NOTE — TELEPHONE ENCOUNTER
Unfortunately the hospital guidelines are strict with this--unless there is a medical/obstetrical reason, we are not allowed to schedule earlier than 39 weeks.

## 2020-09-03 ENCOUNTER — PRENATAL OFFICE VISIT (OUTPATIENT)
Dept: OBGYN | Facility: CLINIC | Age: 35
End: 2020-09-03
Payer: COMMERCIAL

## 2020-09-03 VITALS
HEART RATE: 93 BPM | SYSTOLIC BLOOD PRESSURE: 117 MMHG | TEMPERATURE: 97 F | WEIGHT: 196 LBS | DIASTOLIC BLOOD PRESSURE: 74 MMHG | BODY MASS INDEX: 29.8 KG/M2

## 2020-09-03 DIAGNOSIS — Z67.91 RH NEGATIVE STATE IN ANTEPARTUM PERIOD: ICD-10-CM

## 2020-09-03 DIAGNOSIS — O26.899 RH NEGATIVE STATE IN ANTEPARTUM PERIOD: ICD-10-CM

## 2020-09-03 DIAGNOSIS — Z23 NEED FOR TDAP VACCINATION: ICD-10-CM

## 2020-09-03 DIAGNOSIS — O09.90 SUPERVISION OF HIGH RISK PREGNANCY, ANTEPARTUM: Primary | ICD-10-CM

## 2020-09-03 LAB
BLD GP AB SCN SERPL QL: NORMAL
GLUCOSE 1H P 50 G GLC PO SERPL-MCNC: 162 MG/DL (ref 60–129)
HGB BLD-MCNC: 11.3 G/DL (ref 11.7–15.7)

## 2020-09-03 PROCEDURE — 99207 ZZC PRENATAL VISIT: CPT | Performed by: OBSTETRICS & GYNECOLOGY

## 2020-09-03 PROCEDURE — 96372 THER/PROPH/DIAG INJ SC/IM: CPT | Performed by: OBSTETRICS & GYNECOLOGY

## 2020-09-03 PROCEDURE — 36415 COLL VENOUS BLD VENIPUNCTURE: CPT | Performed by: OBSTETRICS & GYNECOLOGY

## 2020-09-03 PROCEDURE — 00000218 ZZHCL STATISTIC OBHBG - HEMOGLOBIN: Performed by: OBSTETRICS & GYNECOLOGY

## 2020-09-03 PROCEDURE — 90471 IMMUNIZATION ADMIN: CPT | Performed by: OBSTETRICS & GYNECOLOGY

## 2020-09-03 PROCEDURE — 82950 GLUCOSE TEST: CPT | Performed by: OBSTETRICS & GYNECOLOGY

## 2020-09-03 PROCEDURE — 90715 TDAP VACCINE 7 YRS/> IM: CPT | Performed by: OBSTETRICS & GYNECOLOGY

## 2020-09-03 PROCEDURE — 86850 RBC ANTIBODY SCREEN: CPT | Performed by: OBSTETRICS & GYNECOLOGY

## 2020-09-08 NOTE — PROGRESS NOTES
28w6d  No complaints. Baby is moving well.  Patient plans repeat  section and that has been scheduled.  We reviewed future visits and she prefers office visits rather than phone visits from here on out.   GCT today elevated so will need to schedule 3 hr test. discussed Rh negative status and need for rhogam. Tdap and rhogam done today. RR

## 2020-09-11 DIAGNOSIS — O99.810 ABNORMAL MATERNAL GLUCOSE TOLERANCE, ANTEPARTUM: ICD-10-CM

## 2020-09-11 LAB
GLUCOSE 1H P 100 G GLC PO SERPL-MCNC: 160 MG/DL (ref 60–179)
GLUCOSE 2H P 100 G GLC PO SERPL-MCNC: 159 MG/DL (ref 60–154)
GLUCOSE 3H P 100 G GLC PO SERPL-MCNC: 81 MG/DL (ref 60–139)
GLUCOSE P FAST SERPL-MCNC: 81 MG/DL (ref 60–94)

## 2020-09-11 PROCEDURE — 82952 GTT-ADDED SAMPLES: CPT | Performed by: OBSTETRICS & GYNECOLOGY

## 2020-09-11 PROCEDURE — 82951 GLUCOSE TOLERANCE TEST (GTT): CPT | Performed by: OBSTETRICS & GYNECOLOGY

## 2020-09-11 PROCEDURE — 36415 COLL VENOUS BLD VENIPUNCTURE: CPT | Performed by: OBSTETRICS & GYNECOLOGY

## 2020-09-17 ENCOUNTER — PRENATAL OFFICE VISIT (OUTPATIENT)
Dept: OBGYN | Facility: CLINIC | Age: 35
End: 2020-09-17
Payer: COMMERCIAL

## 2020-09-17 VITALS
WEIGHT: 209 LBS | BODY MASS INDEX: 31.78 KG/M2 | HEART RATE: 92 BPM | SYSTOLIC BLOOD PRESSURE: 129 MMHG | TEMPERATURE: 97.2 F | DIASTOLIC BLOOD PRESSURE: 82 MMHG

## 2020-09-17 DIAGNOSIS — O09.90 SUPERVISION OF HIGH RISK PREGNANCY, ANTEPARTUM: Primary | ICD-10-CM

## 2020-09-17 DIAGNOSIS — Z98.891 H/O CESAREAN SECTION: ICD-10-CM

## 2020-09-17 DIAGNOSIS — O26.843 SIZE OF FETUS INCONSISTENT WITH DATES IN THIRD TRIMESTER: ICD-10-CM

## 2020-09-17 DIAGNOSIS — Z23 NEED FOR PROPHYLACTIC VACCINATION AND INOCULATION AGAINST INFLUENZA: ICD-10-CM

## 2020-09-17 PROCEDURE — 99207 ZZC PRENATAL VISIT: CPT | Performed by: OBSTETRICS & GYNECOLOGY

## 2020-09-17 PROCEDURE — 90471 IMMUNIZATION ADMIN: CPT | Performed by: OBSTETRICS & GYNECOLOGY

## 2020-09-17 PROCEDURE — 90686 IIV4 VACC NO PRSV 0.5 ML IM: CPT | Performed by: OBSTETRICS & GYNECOLOGY

## 2020-09-17 NOTE — PROGRESS NOTES
[facility]- OBGYN    CC: routine prenatal visit.    S:Linnea M Schirmer is a 35 year old  at 30w6d by LMP c/w 11w0d us who presents today for routine prenatal visit.  Patient reports feeling well.  She continues to have MSK back pain that is improved with use of pregnancy support belt.  She also notes for the past 2-3 days she has had low abdominal itching.  She wonders if it is stretch marks or a rash.  She has not tried anything for it.  She denies any itching on her palms or soles.  Patient denies any contractions, vaginal bleeding, leakage of fluid.  She states she is feeling normal fetal movement.  She denies any headache, changes in vision, chest pain, chest pressure, shortness of breath, nausea, vomiting, diarrhea, or constipation.      Pregnancy notable for:  -Rh negative  -history of gestational hypertension  -history of  section  -hypothyroid  -failed GCT, passed GTT    O:   Patient Vitals for the past 24 hrs:   BP Temp Temp src Pulse Weight   20 1652 129/82 97.2  F (36.2  C) Oral 92 94.8 kg (209 lb)   ]  Exam:  General- awake, alert, answering questions appropriately, appears comfortable  CV- regular rate  Lung- breathing comfortably on room air  Abd- no rash, normal appearing skin with slightly erythematous stretch marks   Ext- bilateral lower extremities with no edema    Doptones- 144 bpm  Fundal height-37 cm    A&P: Linnea M Schirmer is a 35 year old  at 30w6d by LMP c/w 11w0d us who presents today for routine prenatal visit.     (O09.90) Supervision of high risk pregnancy, antepartum  (primary encounter diagnosis)  Comment: Doing well. Reviewed s/sx of PTL, PPROM, fetal kick counts.  Reviewed use of non-scented emollients for skin stretch itching.  Can take benadryl if disrupts sleep.    Plan: D    (Z23) Need for prophylactic vaccination and inoculation against influenza  Comment: accepts flu vaccine  Plan: INFLUENZA VACCINE IM > 6 MONTHS VALENT IIV4          [79684], Vaccine Administration, Initial         [03232]    (O26.843) Size of fetus inconsistent with dates in third trimester  Comment: Patient's fundal height was 37cm today.  Size> dates   Plan: US OB > 14 Weeks within next week    (Z98.891) H/O  section  Comment: Patient plans repeat  Plan: Scheduled for 2020    Samanta Baxter MD

## 2020-09-17 NOTE — PATIENT INSTRUCTIONS
Patient Education     Adapting to Pregnancy: Third Trimester    Although common during pregnancy, some discomforts may seem worse in the final weeks. Simple lifestyle changes can help. Take care of yourself. And ask your partner to help out with small tasks.  Limiting leg problems  Ways to combat leg issues:    Wear support hose all day.    Avoid snug shoes and clothes that bind, like tight pants and socks with elastic tops.    Sit with your feet and legs raised often.  Caring for your breasts  Tips to follow include:    Wash with plain water. Avoid using harsh soaps or rubbing alcohol. They may cause dryness.    Wear a nursing bra for extra support. It can also hide any leaks from your nipples.  Controlling hemorrhoids  Ways to avoid hemorrhoids include:    Eat foods that are high in fiber. Also, exercise and drink enough fluids. This will reduce constipation and hemorrhoids.    Sleep and nap on your side. This limits pressure on the veins of your rectum.    Try not to stand or sit for long periods.  Controlling back pain  As your body changes during pregnancy, your back must work in new ways. Back pain is due to many causes. Physical changes in your body can strain your back and its supporting muscles. Also, hormones (chemicals that carry messages throughout the body) increase during pregnancy. This can affect how your muscles and joints work together. All of these changes can lead to pain. Pain may be felt in the upper or lower back. Pain is also common in the pelvis. Some pregnant women have sciatica. This is pain caused by pressure on the sciatic nerve running down the back of the leg. Ask your healthcare provider for specific tips and exercises to help control your back pain.  Tips to help you rest  Good rest and sleep will help you feel better. Here are some ideas:    Ask your partner to massage your shoulders, neck, or back.    Limit the errands you do each day.    Lie down in the afternoon or after work  for a few minutes.    Take a warm bath before you go to sleep.    Drink warm milk or teas without caffeine.    Avoid coffee, black tea, and cola.  Stopping heartburn    Avoid spicy, greasy, fried, or acidic foods.    Eat small amounts more often. Eat slowly.   Wait 2 hours after eating before lying down.    Sleep with your upper body raised 6 inches.   Managing mood swings  Ways to manage mood swings include:    Know that mood changes are normal.    Exercise often, but get plenty of rest.    Address any concerns and limit stress. Talking to your partner, other women, or your healthcare provider may help.  Dealing with urinary frequency  Tips to deal with having to urinate often include:    Drink plenty of water all day. If you drink a lot in the evening, though, you may have to get up more in the night.    Limit coffee, black tea, and cola.  Date Last Reviewed: 2/1/2018 2000-2019 [employer]. [address] [place] [address]. All rights reserved. This information is not intended as a substitute for professional medical care. Always follow your healthcare professional's instructions.           Patient Education     Pregnancy: Your Third Trimester Changes  As the baby grows, your body changes too. You may also see signs that your body is getting ready for labor. Be patient. Within a few more weeks, your baby will be born.  How you are changing  Your body is preparing for the birth of your baby. Some of the most common changes are listed below. If you have any questions or concerns, ask your healthcare provider:    You ll gain more weight from fluids, extra blood, and fat deposits.    Your breasts will grow as your body gets ready to feed the baby. They may be more tender. You may also notice a slight yellow or white discharge from the nipples.    Discharge from your vagina may increase. This is normal.    You might see some skin color changes on your forehead, cheeks, or nose. Most of these  will go away after you deliver.  How your baby is growing       Month 7  Your baby can open and close his or her eyes and weighs around 4 pounds. If born prematurely (too early), your baby would likely survive with special care. Month 8  Your baby is building up body fat and weighs around 6 pounds. Month 9  Your baby weighs nearly 7 pounds and is about 19 to 21 inches long. In other words, any day now...   Date Last Reviewed: 2/1/2018 2000-2019 [employer]. [address] [place] [address], [place] [address]. All rights reserved. This information is not intended as a substitute for professional medical care. Always follow your healthcare professional's instructions.

## 2020-09-22 DIAGNOSIS — O26.843 SIZE OF FETUS INCONSISTENT WITH DATES IN THIRD TRIMESTER: Primary | ICD-10-CM

## 2020-09-25 ENCOUNTER — ANCILLARY PROCEDURE (OUTPATIENT)
Dept: ULTRASOUND IMAGING | Facility: CLINIC | Age: 35
End: 2020-09-25
Attending: OBSTETRICS & GYNECOLOGY
Payer: COMMERCIAL

## 2020-09-25 DIAGNOSIS — O26.843 SIZE OF FETUS INCONSISTENT WITH DATES IN THIRD TRIMESTER: ICD-10-CM

## 2020-09-25 PROCEDURE — 76816 OB US FOLLOW-UP PER FETUS: CPT | Performed by: OBSTETRICS & GYNECOLOGY

## 2020-10-01 ENCOUNTER — PRENATAL OFFICE VISIT (OUTPATIENT)
Dept: OBGYN | Facility: CLINIC | Age: 35
End: 2020-10-01
Payer: COMMERCIAL

## 2020-10-01 VITALS
DIASTOLIC BLOOD PRESSURE: 81 MMHG | SYSTOLIC BLOOD PRESSURE: 126 MMHG | BODY MASS INDEX: 31.19 KG/M2 | WEIGHT: 205.8 LBS | HEART RATE: 97 BPM | HEIGHT: 68 IN

## 2020-10-01 DIAGNOSIS — O09.90 SUPERVISION OF HIGH RISK PREGNANCY, ANTEPARTUM: Primary | ICD-10-CM

## 2020-10-01 DIAGNOSIS — Z98.891 H/O CESAREAN SECTION: ICD-10-CM

## 2020-10-01 PROCEDURE — 99207 PR PRENATAL VISIT: CPT | Performed by: OBSTETRICS & GYNECOLOGY

## 2020-10-01 ASSESSMENT — PAIN SCALES - GENERAL: PAINLEVEL: NO PAIN (0)

## 2020-10-01 ASSESSMENT — MIFFLIN-ST. JEOR: SCORE: 1677

## 2020-10-01 NOTE — PROGRESS NOTES
"[facility]- OBGYN    CC: routine prenatal visit.    S:Linnea M Schirmer is a 35 year old  at 32w6d by LMP c/w 11w0d us who presents today for routine prenatal visit.  Patient reports feeling very well.  Patient denies any contractions, vaginal bleeding, leakage of fluid.  She states she is feeling normal fetal movement.      Pregnancy notable for:  -Rh negative  -history of gestational hypertension  -history of , plans repeat  -hypothyroidism  -failed GCT, passed GTT    O:   Patient Vitals for the past 24 hrs:   BP Pulse Height Weight   10/01/20 1551 126/81 97 1.727 m (5' 8\") 93.4 kg (205 lb 12.8 oz)   ]  Exam:  General- awake, alert, answering questions appropriately, appears comfortable  CV- regular rate  Lung- breathing comfortably on room air  Ext- bilateral lower extremities with no edema    Doptones- 132 bpm    Imaging and Labs:  [facility]  ULTRASOUND - OB FOLLOW UP > 14 Weeks- Transabdominal     Referring Provider: Samanta Baxter     ====================================  INDICATIONS FOR ULTRASOUND:  OB History:   Present Conditions: EFW     CLINICAL INFORMATION     LMP:   - sure  EDC: 2020    EGA: 32w 0d  Previous US: Yes     EDC: 2020 correspond                                                                                          =================  Toney Gestation.     Fetal presentation: Breech  Placenta: Anterior, placental edge not visualized stGstrstastdstest:st st1st MEASUREMENTS  BPD 8.33 cm 33w4d 83.2%   HC 30.02 cm 33w2d 47.2%   AC 29.60 cm 33w4d 88.4%   FL 6.50 cm 33w4d 77.6%   HL 5.48 cm 31w6d 46.7%   Fetal Heart Rate 145 bpm       Amniotic fluid 5.6 cm MVP       EFW (lbs/oz) 4 lbs 14ozs       EFW (g) 2216 g 69.3%     EDC:   11/10/2020 GA by Current Scan: 33w3d correspond          ==================  FETAL SURVEY       Visualized: 4 Chamber Heart, Stomach, Kidneys and Bladder.     ===============  MATERNAL ANATOMY     Right Ovary: Not " visualized  Left Ovary: Visualized      ======================================  Impression:     EFW by today's ultrasound is 2216grams, which is the 69%tile.  Normal OFELIA, Breech presentation.     Lynn Miner MD     A&P: Linnea M Schirmer is a 35 year old  at 32w6d by LMP c/w 11w0d us who presents today for routine prenatal visit.     (O09.90) Supervision of high risk pregnancy, antepartum  (primary encounter diagnosis)  Comment: Doing well.  Discussed s/sx PTL, PPROM, SERGIO  Plan: Next visit with Dr. Sanchez on 10/15    (Z98.891) H/O  section  Comment: Plans repeat.   Plan: Scheduled for repeat      Samanta Baxter MD

## 2020-10-01 NOTE — PATIENT INSTRUCTIONS
Patient Education     Adapting to Pregnancy: Third Trimester    Although common during pregnancy, some discomforts may seem worse in the final weeks. Simple lifestyle changes can help. Take care of yourself. And ask your partner to help out with small tasks.  Limiting leg problems  Ways to combat leg issues:    Wear support hose all day.    Avoid snug shoes and clothes that bind, like tight pants and socks with elastic tops.    Sit with your feet and legs raised often.  Caring for your breasts  Tips to follow include:    Wash with plain water. Avoid using harsh soaps or rubbing alcohol. They may cause dryness.    Wear a nursing bra for extra support. It can also hide any leaks from your nipples.  Controlling hemorrhoids  Ways to avoid hemorrhoids include:    Eat foods that are high in fiber. Also, exercise and drink enough fluids. This will reduce constipation and hemorrhoids.    Sleep and nap on your side. This limits pressure on the veins of your rectum.    Try not to stand or sit for long periods.  Controlling back pain  As your body changes during pregnancy, your back must work in new ways. Back pain is due to many causes. Physical changes in your body can strain your back and its supporting muscles. Also, hormones (chemicals that carry messages throughout the body) increase during pregnancy. This can affect how your muscles and joints work together. All of these changes can lead to pain. Pain may be felt in the upper or lower back. Pain is also common in the pelvis. Some pregnant women have sciatica. This is pain caused by pressure on the sciatic nerve running down the back of the leg. Ask your healthcare provider for specific tips and exercises to help control your back pain.  Tips to help you rest  Good rest and sleep will help you feel better. Here are some ideas:    Ask your partner to massage your shoulders, neck, or back.    Limit the errands you do each day.    Lie down in the afternoon or after work  for a few minutes.    Take a warm bath before you go to sleep.    Drink warm milk or teas without caffeine.    Avoid coffee, black tea, and cola.  Stopping heartburn    Avoid spicy, greasy, fried, or acidic foods.    Eat small amounts more often. Eat slowly.   Wait 2 hours after eating before lying down.    Sleep with your upper body raised 6 inches.   Managing mood swings  Ways to manage mood swings include:    Know that mood changes are normal.    Exercise often, but get plenty of rest.    Address any concerns and limit stress. Talking to your partner, other women, or your healthcare provider may help.  Dealing with urinary frequency  Tips to deal with having to urinate often include:    Drink plenty of water all day. If you drink a lot in the evening, though, you may have to get up more in the night.    Limit coffee, black tea, and cola.  Date Last Reviewed: 2/1/2018 2000-2019 [employer]. [address] [place] [address]. All rights reserved. This information is not intended as a substitute for professional medical care. Always follow your healthcare professional's instructions.           Patient Education     Pregnancy: Your Third Trimester Changes  As the baby grows, your body changes too. You may also see signs that your body is getting ready for labor. Be patient. Within a few more weeks, your baby will be born.  How you are changing  Your body is preparing for the birth of your baby. Some of the most common changes are listed below. If you have any questions or concerns, ask your healthcare provider:    You ll gain more weight from fluids, extra blood, and fat deposits.    Your breasts will grow as your body gets ready to feed the baby. They may be more tender. You may also notice a slight yellow or white discharge from the nipples.    Discharge from your vagina may increase. This is normal.    You might see some skin color changes on your forehead, cheeks, or nose. Most of these  will go away after you deliver.  How your baby is growing       Month 7  Your baby can open and close his or her eyes and weighs around 4 pounds. If born prematurely (too early), your baby would likely survive with special care. Month 8  Your baby is building up body fat and weighs around 6 pounds. Month 9  Your baby weighs nearly 7 pounds and is about 19 to 21 inches long. In other words, any day now...   Date Last Reviewed: 2/1/2018 2000-2019 [employer]. [address] [place] [address], [place] [address]. All rights reserved. This information is not intended as a substitute for professional medical care. Always follow your healthcare professional's instructions.

## 2020-10-22 ENCOUNTER — PRENATAL OFFICE VISIT (OUTPATIENT)
Dept: OBGYN | Facility: CLINIC | Age: 35
End: 2020-10-22
Payer: COMMERCIAL

## 2020-10-22 VITALS
HEART RATE: 87 BPM | BODY MASS INDEX: 31.78 KG/M2 | WEIGHT: 209 LBS | SYSTOLIC BLOOD PRESSURE: 123 MMHG | TEMPERATURE: 97.5 F | DIASTOLIC BLOOD PRESSURE: 79 MMHG

## 2020-10-22 DIAGNOSIS — O09.90 SUPERVISION OF HIGH RISK PREGNANCY, ANTEPARTUM: Primary | ICD-10-CM

## 2020-10-22 DIAGNOSIS — Z98.891 H/O CESAREAN SECTION: ICD-10-CM

## 2020-10-22 LAB — HGB BLD-MCNC: 12 G/DL (ref 11.7–15.7)

## 2020-10-22 PROCEDURE — 99N1025 PR STATISTIC OBHBG - HEMOGLOBIN: Performed by: OBSTETRICS & GYNECOLOGY

## 2020-10-22 PROCEDURE — 87653 STREP B DNA AMP PROBE: CPT | Performed by: OBSTETRICS & GYNECOLOGY

## 2020-10-22 PROCEDURE — 36415 COLL VENOUS BLD VENIPUNCTURE: CPT | Performed by: OBSTETRICS & GYNECOLOGY

## 2020-10-22 PROCEDURE — 99207 PR PRENATAL VISIT: CPT | Performed by: OBSTETRICS & GYNECOLOGY

## 2020-10-22 NOTE — PROGRESS NOTES
35w6d  Active fetal movement. No contractions. Some back pain, sciatic pain. No leaking or bleeding.  Fetus oblique with head in maternal LLQ.   GBS collected.  RTC weekly.  Georgina Nunez MD

## 2020-10-24 LAB
GP B STREP DNA SPEC QL NAA+PROBE: NEGATIVE
SPECIMEN SOURCE: NORMAL

## 2020-10-28 ENCOUNTER — PRENATAL OFFICE VISIT (OUTPATIENT)
Dept: OBGYN | Facility: CLINIC | Age: 35
End: 2020-10-28
Payer: COMMERCIAL

## 2020-10-28 VITALS
SYSTOLIC BLOOD PRESSURE: 136 MMHG | BODY MASS INDEX: 31.96 KG/M2 | WEIGHT: 210.9 LBS | HEIGHT: 68 IN | DIASTOLIC BLOOD PRESSURE: 78 MMHG | HEART RATE: 89 BPM

## 2020-10-28 DIAGNOSIS — O09.90 SUPERVISION OF HIGH RISK PREGNANCY, ANTEPARTUM: Primary | ICD-10-CM

## 2020-10-28 DIAGNOSIS — Z98.891 H/O CESAREAN SECTION: ICD-10-CM

## 2020-10-28 PROCEDURE — 99207 PR PRENATAL VISIT: CPT | Performed by: OBSTETRICS & GYNECOLOGY

## 2020-10-28 ASSESSMENT — MIFFLIN-ST. JEOR: SCORE: 1700.14

## 2020-10-28 ASSESSMENT — PAIN SCALES - GENERAL: PAINLEVEL: NO PAIN (0)

## 2020-10-28 NOTE — Clinical Note
Hi,  Is she doing ERAS? Do you want her to have labs in advance?   Rh neg, so type and screen will prob take a while.    Janet

## 2020-10-28 NOTE — PROGRESS NOTES
"Chief Complaint   Patient presents with     Prenatal Care       Initial /78 (BP Location: Right arm, Patient Position: Sitting, Cuff Size: Adult Regular)   Pulse 89   Ht 1.727 m (5' 8\")   Wt 95.7 kg (210 lb 14.4 oz)   LMP 2020 (LMP Unknown)   Breastfeeding No   BMI 32.07 kg/m   Estimated body mass index is 32.07 kg/m  as calculated from the following:    Height as of this encounter: 1.727 m (5' 8\").    Weight as of this encounter: 95.7 kg (210 lb 14.4 oz).  BP completed using cuff size: regular    Questioned patient about current smoking habits.  Pt. has never smoked.          The following HM Due: NONE      The following patient reported/Care Every where data was sent to:  P ABSTRACT QUALITY INITIATIVES [67012]  n/a      patient has appointment for today                "

## 2020-10-29 NOTE — PROGRESS NOTES
GBS: Negative  Hemoglobin   Date Value Ref Range Status   10/22/2020 12.0 11.7 - 15.7 g/dL Final   ]    Breast pump rx: has already  Labor orders: signed and held for repeat CS  Birth plan: repeat CS  Length of stay: discussed      Doing well.   Good fetal movement.   BP still normal.   Will ask primary surgeon if she is doing ERAS and then arrange lab appointments if needed.   RTC weekly.

## 2020-11-05 NOTE — PROGRESS NOTES
38w0d  Doing well and ready for delivery.  No significant contractions, vaginal bleeding or leakage of fluid.  Baby active.  Discussed post-partum meds.  OTC meds prescribed.  Discussed narcotic rx will be given at discharge.  Has repeat  scheduled on .  Labs and COVID test scheduled.  Discussed current visitor restrictions in the setting of COVID, as of the time of her appointment.  Given steep increase in cases lately, cannot guarantee things won't change before her delivery.  Offered that she could reach out next week to see if there are any further changes.  RTC for labs, post-partum exam and prn.  Georgina Sanchez MD

## 2020-11-06 ENCOUNTER — PRENATAL OFFICE VISIT (OUTPATIENT)
Dept: OBGYN | Facility: CLINIC | Age: 35
End: 2020-11-06
Payer: COMMERCIAL

## 2020-11-06 VITALS
DIASTOLIC BLOOD PRESSURE: 84 MMHG | OXYGEN SATURATION: 99 % | SYSTOLIC BLOOD PRESSURE: 131 MMHG | BODY MASS INDEX: 32.11 KG/M2 | HEART RATE: 112 BPM | TEMPERATURE: 97.7 F | WEIGHT: 211.2 LBS

## 2020-11-06 DIAGNOSIS — Z01.818 PRE-OP EXAM: Primary | ICD-10-CM

## 2020-11-06 DIAGNOSIS — O09.90 SUPERVISION OF HIGH RISK PREGNANCY, ANTEPARTUM: Primary | ICD-10-CM

## 2020-11-06 DIAGNOSIS — Z98.891 H/O CESAREAN SECTION: ICD-10-CM

## 2020-11-06 DIAGNOSIS — O09.90 SUPERVISION OF HIGH RISK PREGNANCY, ANTEPARTUM: ICD-10-CM

## 2020-11-06 PROCEDURE — 99207 PR PRENATAL VISIT: CPT | Performed by: OBSTETRICS & GYNECOLOGY

## 2020-11-06 RX ORDER — ACETAMINOPHEN 325 MG/1
650 TABLET ORAL EVERY 6 HOURS PRN
Qty: 100 TABLET | Refills: 0 | Status: SHIPPED | OUTPATIENT
Start: 2020-11-06 | End: 2021-11-06

## 2020-11-06 RX ORDER — IBUPROFEN 600 MG/1
600 TABLET, FILM COATED ORAL EVERY 6 HOURS PRN
Qty: 60 TABLET | Refills: 0 | Status: SHIPPED | OUTPATIENT
Start: 2020-11-06 | End: 2021-11-06

## 2020-11-06 RX ORDER — AMOXICILLIN 250 MG
1 CAPSULE ORAL DAILY
Qty: 100 TABLET | Refills: 0 | Status: SHIPPED | OUTPATIENT
Start: 2020-11-06 | End: 2022-03-16

## 2020-11-10 DIAGNOSIS — Z11.59 ENCOUNTER FOR SCREENING FOR OTHER VIRAL DISEASES: ICD-10-CM

## 2020-11-10 DIAGNOSIS — O09.90 SUPERVISION OF HIGH RISK PREGNANCY, ANTEPARTUM: ICD-10-CM

## 2020-11-10 DIAGNOSIS — Z01.818 PRE-OP EXAM: ICD-10-CM

## 2020-11-10 LAB
ABO + RH BLD: ABNORMAL
ABO + RH BLD: ABNORMAL
BLD GP AB INVEST PLASRBC-IMP: ABNORMAL
BLD GP AB SCN SERPL QL: ABNORMAL
BLOOD BANK CMNT PATIENT-IMP: ABNORMAL
ERYTHROCYTE [DISTWIDTH] IN BLOOD BY AUTOMATED COUNT: 14.4 % (ref 10–15)
HCT VFR BLD AUTO: 37.7 % (ref 35–47)
HGB BLD-MCNC: 12.8 G/DL (ref 11.7–15.7)
MCH RBC QN AUTO: 30.3 PG (ref 26.5–33)
MCHC RBC AUTO-ENTMCNC: 34 G/DL (ref 31.5–36.5)
MCV RBC AUTO: 89 FL (ref 78–100)
PLATELET # BLD AUTO: 221 10E9/L (ref 150–450)
RBC # BLD AUTO: 4.22 10E12/L (ref 3.8–5.2)
SPECIMEN EXP DATE BLD: ABNORMAL
WBC # BLD AUTO: 6.5 10E9/L (ref 4–11)

## 2020-11-10 PROCEDURE — 36415 COLL VENOUS BLD VENIPUNCTURE: CPT | Performed by: OBSTETRICS & GYNECOLOGY

## 2020-11-10 PROCEDURE — 86900 BLOOD TYPING SEROLOGIC ABO: CPT | Performed by: OBSTETRICS & GYNECOLOGY

## 2020-11-10 PROCEDURE — 86850 RBC ANTIBODY SCREEN: CPT | Performed by: OBSTETRICS & GYNECOLOGY

## 2020-11-10 PROCEDURE — 86901 BLOOD TYPING SEROLOGIC RH(D): CPT | Performed by: OBSTETRICS & GYNECOLOGY

## 2020-11-10 PROCEDURE — U0003 INFECTIOUS AGENT DETECTION BY NUCLEIC ACID (DNA OR RNA); SEVERE ACUTE RESPIRATORY SYNDROME CORONAVIRUS 2 (SARS-COV-2) (CORONAVIRUS DISEASE [COVID-19]), AMPLIFIED PROBE TECHNIQUE, MAKING USE OF HIGH THROUGHPUT TECHNOLOGIES AS DESCRIBED BY CMS-2020-01-R: HCPCS | Performed by: OBSTETRICS & GYNECOLOGY

## 2020-11-10 PROCEDURE — 85027 COMPLETE CBC AUTOMATED: CPT | Performed by: OBSTETRICS & GYNECOLOGY

## 2020-11-10 PROCEDURE — 86780 TREPONEMA PALLIDUM: CPT | Mod: 90 | Performed by: OBSTETRICS & GYNECOLOGY

## 2020-11-10 PROCEDURE — 86870 RBC ANTIBODY IDENTIFICATION: CPT | Performed by: OBSTETRICS & GYNECOLOGY

## 2020-11-10 PROCEDURE — 99000 SPECIMEN HANDLING OFFICE-LAB: CPT | Performed by: OBSTETRICS & GYNECOLOGY

## 2020-11-11 ENCOUNTER — ANESTHESIA EVENT (OUTPATIENT)
Dept: OBGYN | Facility: CLINIC | Age: 35
End: 2020-11-11
Payer: COMMERCIAL

## 2020-11-11 LAB
SARS-COV-2 RNA SPEC QL NAA+PROBE: NOT DETECTED
SPECIMEN SOURCE: NORMAL

## 2020-11-11 NOTE — ANESTHESIA PREPROCEDURE EVALUATION
"Anesthesia Pre-Procedure Evaluation    Patient: Linnea M Schirmer   MRN:     [id number] Gender:   female   Age:    35 year old :      1985        Preoperative Diagnosis: H/O  section [Z98.891]   Procedure(s):   SECTION     LABS:  CBC:   Lab Results   Component Value Date    WBC 6.5 11/10/2020    WBC 7.6 2020    HGB 12.8 11/10/2020    HGB 12.0 10/22/2020    HCT 37.7 11/10/2020    HCT 40.8 2020     11/10/2020     2020     BMP:   Lab Results   Component Value Date     2015     10/30/2013    POTASSIUM 3.7 2015    POTASSIUM 3.5 10/30/2013    CHLORIDE 104 2015    CHLORIDE 100 10/30/2013    CO2 26 2015    CO2 28 10/30/2013    BUN 9 2015    BUN 11 10/30/2013    CR 0.72 2015    CR 0.84 10/30/2013    GLC 99 2015    GLC 96 10/30/2013     COAGS: No results found for: PTT, INR, FIBR  POC:   Lab Results   Component Value Date    HCG Negative 2015    HCGS Negative 10/30/2013     OTHER:   Lab Results   Component Value Date    A1C 4.9 2019    MICK 8.6 2015    TSH 1.58 2020    T4 1.08 2019        Preop Vitals    BP Readings from Last 3 Encounters:   20 131/84   10/28/20 136/78   10/22/20 123/79    Pulse Readings from Last 3 Encounters:   20 112   10/28/20 89   10/22/20 87      Resp Readings from Last 3 Encounters:   19 20   07/16/15 16   10/30/13 22    SpO2 Readings from Last 3 Encounters:   20 99%   07/16/15 100%   10/31/13 99%      Temp Readings from Last 1 Encounters:   20 36.5  C (97.7  F) (Oral)    Ht Readings from Last 1 Encounters:   10/28/20 1.727 m (5' 8\")      Wt Readings from Last 1 Encounters:   20 95.8 kg (211 lb 3.2 oz)    Estimated body mass index is 32.11 kg/m  as calculated from the following:    Height as of 10/28/20: 1.727 m (5' 8\").    Weight as of 20: 95.8 kg (211 lb 3.2 oz).     LDA:  Peripheral IV 10/30/13 Right (Active)   Number of " days: 2569        Past Medical History:   Diagnosis Date     Migraines       Past Surgical History:   Procedure Laterality Date     CYSTECTOMY OVARIAN BENIGN        Allergies   Allergen Reactions     Penicillins      hives        Anesthesia Evaluation     . Pt has had prior anesthetic. Type: Regional    No history of anesthetic complications          ROS/MED HX    ENT/Pulmonary:  - neg pulmonary ROS     Neurologic:     (+)migraines,     Cardiovascular: Comment: Gestational htn w prior pregnancy. Normal pressures this pregnancy        METS/Exercise Tolerance:     Hematologic:  - neg hematologic  ROS       Musculoskeletal:  - neg musculoskeletal ROS       GI/Hepatic:  - neg GI/hepatic ROS      (-) GERD   Renal/Genitourinary:  - ROS Renal section negative       Endo:     (+) thyroid problem hypothyroidism, .   (-) Type II DM   Psychiatric:  - neg psychiatric ROS       Infectious Disease:  - neg infectious disease ROS       Malignancy:      - no malignancy   Other:                         PHYSICAL EXAM:   Mental Status/Neuro: A/A/O   Airway: Facies: Feasible  Mallampati: III  Mouth/Opening: Full  TM distance: > 6 cm  Neck ROM: Full   Respiratory: Auscultation: CTAB     Resp. Rate: Normal     Resp. Effort: Normal      CV: Rhythm: Regular  Rate: Age appropriate  Heart: Normal Sounds  Edema: None   Comments:      Dental: Normal Dentition                Assessment:   ASA SCORE: 2    H&P: History and physical reviewed and following examination; no interval change.   Smoking Status:  Non-Smoker/Unknown   NPO Status: ELEVATED Aspiration Risk/Unknown     Plan:   Anes. Type:  MAC; Spinal; Peripheral Nerve Block; For Post-op pain in coordination with surgeon     Block Details: TAP; Exparel; Single Shot   Pre-Medication: None   Induction:  IV (RSI)   Airway: Native Airway   Access/Monitoring: PIV   Maintenance: N/a     Postop Plan:   Postop Pain: Regional  Postop Sedation/Airway: Not planned  Disposition: Inpatient/Admit     PONV  Management:   Adult Risk Factors: Female, Non-Smoker   Prevention: Ondansetron     CONSENT: Via Surgical Consent   Plan and risks discussed with: Patient   Blood Products: Consented (ALL Blood Products)       Comments for Plan/Consent:  Discussed risks of anesthesia including nausea, vomiting, headache, itching, bleeding, infection, cardiopulmonary complications, neurologic complications, and serious complications.                   Thierry Espana MD

## 2020-11-12 LAB — T PALLIDUM AB SER QL: NONREACTIVE

## 2020-11-13 ENCOUNTER — HOSPITAL ENCOUNTER (INPATIENT)
Facility: CLINIC | Age: 35
LOS: 2 days | Discharge: HOME HEALTH | End: 2020-11-15
Attending: OBSTETRICS & GYNECOLOGY | Admitting: OBSTETRICS & GYNECOLOGY
Payer: COMMERCIAL

## 2020-11-13 ENCOUNTER — ANCILLARY PROCEDURE (OUTPATIENT)
Dept: ULTRASOUND IMAGING | Facility: CLINIC | Age: 35
End: 2020-11-13
Payer: COMMERCIAL

## 2020-11-13 ENCOUNTER — ANESTHESIA (OUTPATIENT)
Dept: OBGYN | Facility: CLINIC | Age: 35
End: 2020-11-13
Payer: COMMERCIAL

## 2020-11-13 ENCOUNTER — MEDICAL CORRESPONDENCE (OUTPATIENT)
Dept: HEALTH INFORMATION MANAGEMENT | Facility: CLINIC | Age: 35
End: 2020-11-13

## 2020-11-13 DIAGNOSIS — Z98.891 H/O CESAREAN SECTION: ICD-10-CM

## 2020-11-13 LAB — GLUCOSE BLDC GLUCOMTR-MCNC: 76 MG/DL (ref 70–99)

## 2020-11-13 PROCEDURE — 258N000003 HC RX IP 258 OP 636: Performed by: OBSTETRICS & GYNECOLOGY

## 2020-11-13 PROCEDURE — 120N000002 HC R&B MED SURG/OB UMMC

## 2020-11-13 PROCEDURE — 250N000013 HC RX MED GY IP 250 OP 250 PS 637

## 2020-11-13 PROCEDURE — 761N000004 HC RECOVERY PHASE 1 LEVEL 2 EA ADDTL HR: Performed by: OBSTETRICS & GYNECOLOGY

## 2020-11-13 PROCEDURE — 250N000009 HC RX 250

## 2020-11-13 PROCEDURE — 250N000011 HC RX IP 250 OP 636

## 2020-11-13 PROCEDURE — 360N000023 HC SURGERY LEVEL 3 EA 15 ADDTL MIN UMMC: Performed by: OBSTETRICS & GYNECOLOGY

## 2020-11-13 PROCEDURE — 272N000001 HC OR GENERAL SUPPLY STERILE: Performed by: OBSTETRICS & GYNECOLOGY

## 2020-11-13 PROCEDURE — 250N000013 HC RX MED GY IP 250 OP 250 PS 637: Performed by: STUDENT IN AN ORGANIZED HEALTH CARE EDUCATION/TRAINING PROGRAM

## 2020-11-13 PROCEDURE — C9290 INJ, BUPIVACAINE LIPOSOME: HCPCS

## 2020-11-13 PROCEDURE — 250N000009 HC RX 250: Performed by: STUDENT IN AN ORGANIZED HEALTH CARE EDUCATION/TRAINING PROGRAM

## 2020-11-13 PROCEDURE — 370N000001 HC ANESTHESIA TECHNICAL FEE, 1ST 30 MIN: Performed by: OBSTETRICS & GYNECOLOGY

## 2020-11-13 PROCEDURE — 999N000010 HC STATISTIC ANES STAT CODE-CRNA PER MINUTE: Performed by: OBSTETRICS & GYNECOLOGY

## 2020-11-13 PROCEDURE — 250N000009 HC RX 250: Performed by: OBSTETRICS & GYNECOLOGY

## 2020-11-13 PROCEDURE — 999N000139 HC STATISTIC PRE-PROCEDURE ASSESSMENT II: Performed by: OBSTETRICS & GYNECOLOGY

## 2020-11-13 PROCEDURE — 250N000011 HC RX IP 250 OP 636: Performed by: STUDENT IN AN ORGANIZED HEALTH CARE EDUCATION/TRAINING PROGRAM

## 2020-11-13 PROCEDURE — 370N000002 HC ANESTHESIA TECHNICAL FEE, EACH ADDTL 15 MIN: Performed by: OBSTETRICS & GYNECOLOGY

## 2020-11-13 PROCEDURE — 258N000003 HC RX IP 258 OP 636

## 2020-11-13 PROCEDURE — 999N001017 HC STATISTIC GLUCOSE BY METER IP

## 2020-11-13 PROCEDURE — 250N000013 HC RX MED GY IP 250 OP 250 PS 637: Performed by: OBSTETRICS & GYNECOLOGY

## 2020-11-13 PROCEDURE — 59510 CESAREAN DELIVERY: CPT | Mod: GC | Performed by: OBSTETRICS & GYNECOLOGY

## 2020-11-13 PROCEDURE — 271N000001 HC OR GENERAL SUPPLY NON-STERILE: Performed by: OBSTETRICS & GYNECOLOGY

## 2020-11-13 PROCEDURE — 360N000022 HC SURGERY LEVEL 3 1ST 30 MIN - UMMC: Performed by: OBSTETRICS & GYNECOLOGY

## 2020-11-13 PROCEDURE — 250N000011 HC RX IP 250 OP 636: Performed by: OBSTETRICS & GYNECOLOGY

## 2020-11-13 PROCEDURE — 761N000003 HC RECOVERY PHASE 1 LEVEL 2 FIRST HR: Performed by: OBSTETRICS & GYNECOLOGY

## 2020-11-13 RX ORDER — FENTANYL CITRATE 50 UG/ML
INJECTION, SOLUTION INTRAMUSCULAR; INTRAVENOUS PRN
Status: DISCONTINUED | OUTPATIENT
Start: 2020-11-13 | End: 2020-11-13

## 2020-11-13 RX ORDER — ACETAMINOPHEN 325 MG/1
975 TABLET ORAL ONCE
Status: COMPLETED | OUTPATIENT
Start: 2020-11-13 | End: 2020-11-13

## 2020-11-13 RX ORDER — ONDANSETRON 2 MG/ML
4 INJECTION INTRAMUSCULAR; INTRAVENOUS EVERY 30 MIN PRN
Status: DISCONTINUED | OUTPATIENT
Start: 2020-11-13 | End: 2020-11-13 | Stop reason: HOSPADM

## 2020-11-13 RX ORDER — MISOPROSTOL 200 UG/1
400 TABLET ORAL
Status: DISCONTINUED | OUTPATIENT
Start: 2020-11-13 | End: 2020-11-15 | Stop reason: HOSPADM

## 2020-11-13 RX ORDER — METHYLERGONOVINE MALEATE 0.2 MG/ML
200 INJECTION INTRAVENOUS
Status: CANCELLED | OUTPATIENT
Start: 2020-11-13

## 2020-11-13 RX ORDER — METHYLERGONOVINE MALEATE 0.2 MG/ML
INJECTION INTRAVENOUS
Status: COMPLETED
Start: 2020-11-13 | End: 2020-11-13

## 2020-11-13 RX ORDER — AMOXICILLIN 250 MG
1 CAPSULE ORAL 2 TIMES DAILY
Status: CANCELLED | OUTPATIENT
Start: 2020-11-13

## 2020-11-13 RX ORDER — IBUPROFEN 800 MG/1
800 TABLET, FILM COATED ORAL EVERY 6 HOURS
Status: CANCELLED | OUTPATIENT
Start: 2020-11-14

## 2020-11-13 RX ORDER — NALOXONE HYDROCHLORIDE 0.4 MG/ML
.1-.4 INJECTION, SOLUTION INTRAMUSCULAR; INTRAVENOUS; SUBCUTANEOUS
Status: DISCONTINUED | OUTPATIENT
Start: 2020-11-13 | End: 2020-11-15 | Stop reason: HOSPADM

## 2020-11-13 RX ORDER — ACETAMINOPHEN 325 MG/1
975 TABLET ORAL EVERY 6 HOURS
Status: DISCONTINUED | OUTPATIENT
Start: 2020-11-13 | End: 2020-11-15 | Stop reason: HOSPADM

## 2020-11-13 RX ORDER — KETOROLAC TROMETHAMINE 30 MG/ML
30 INJECTION, SOLUTION INTRAMUSCULAR; INTRAVENOUS EVERY 6 HOURS
Status: COMPLETED | OUTPATIENT
Start: 2020-11-13 | End: 2020-11-14

## 2020-11-13 RX ORDER — SODIUM CHLORIDE, SODIUM LACTATE, POTASSIUM CHLORIDE, CALCIUM CHLORIDE 600; 310; 30; 20 MG/100ML; MG/100ML; MG/100ML; MG/100ML
INJECTION, SOLUTION INTRAVENOUS CONTINUOUS
Status: DISCONTINUED | OUTPATIENT
Start: 2020-11-13 | End: 2020-11-13 | Stop reason: HOSPADM

## 2020-11-13 RX ORDER — KETOROLAC TROMETHAMINE 30 MG/ML
30 INJECTION, SOLUTION INTRAMUSCULAR; INTRAVENOUS EVERY 6 HOURS
Status: CANCELLED | OUTPATIENT
Start: 2020-11-13 | End: 2020-11-14

## 2020-11-13 RX ORDER — MORPHINE SULFATE 1 MG/ML
INJECTION, SOLUTION EPIDURAL; INTRATHECAL; INTRAVENOUS PRN
Status: DISCONTINUED | OUTPATIENT
Start: 2020-11-13 | End: 2020-11-13

## 2020-11-13 RX ORDER — ONDANSETRON 4 MG/1
4 TABLET, ORALLY DISINTEGRATING ORAL EVERY 30 MIN PRN
Status: DISCONTINUED | OUTPATIENT
Start: 2020-11-13 | End: 2020-11-13 | Stop reason: HOSPADM

## 2020-11-13 RX ORDER — BISACODYL 10 MG
10 SUPPOSITORY, RECTAL RECTAL DAILY PRN
Status: DISCONTINUED | OUTPATIENT
Start: 2020-11-15 | End: 2020-11-15 | Stop reason: HOSPADM

## 2020-11-13 RX ORDER — LIDOCAINE 40 MG/G
CREAM TOPICAL
Status: CANCELLED | OUTPATIENT
Start: 2020-11-13

## 2020-11-13 RX ORDER — SODIUM CHLORIDE, SODIUM LACTATE, POTASSIUM CHLORIDE, CALCIUM CHLORIDE 600; 310; 30; 20 MG/100ML; MG/100ML; MG/100ML; MG/100ML
INJECTION, SOLUTION INTRAVENOUS CONTINUOUS PRN
Status: DISCONTINUED | OUTPATIENT
Start: 2020-11-13 | End: 2020-11-13

## 2020-11-13 RX ORDER — BUPIVACAINE HYDROCHLORIDE 2.5 MG/ML
INJECTION, SOLUTION EPIDURAL; INFILTRATION; INTRACAUDAL PRN
Status: DISCONTINUED | OUTPATIENT
Start: 2020-11-13 | End: 2020-11-13

## 2020-11-13 RX ORDER — BISACODYL 10 MG
10 SUPPOSITORY, RECTAL RECTAL DAILY PRN
Status: CANCELLED | OUTPATIENT
Start: 2020-11-15

## 2020-11-13 RX ORDER — CARBOPROST TROMETHAMINE 250 UG/ML
250 INJECTION, SOLUTION INTRAMUSCULAR
Status: CANCELLED | OUTPATIENT
Start: 2020-11-13

## 2020-11-13 RX ORDER — OXYTOCIN/0.9 % SODIUM CHLORIDE 30/500 ML
340 PLASTIC BAG, INJECTION (ML) INTRAVENOUS CONTINUOUS PRN
Status: DISCONTINUED | OUTPATIENT
Start: 2020-11-13 | End: 2020-11-15 | Stop reason: HOSPADM

## 2020-11-13 RX ORDER — SIMETHICONE 80 MG
80 TABLET,CHEWABLE ORAL 4 TIMES DAILY PRN
Status: DISCONTINUED | OUTPATIENT
Start: 2020-11-13 | End: 2020-11-15 | Stop reason: HOSPADM

## 2020-11-13 RX ORDER — METHYLERGONOVINE MALEATE 0.2 MG/ML
INJECTION INTRAVENOUS PRN
Status: DISCONTINUED | OUTPATIENT
Start: 2020-11-13 | End: 2020-11-13

## 2020-11-13 RX ORDER — OXYTOCIN/0.9 % SODIUM CHLORIDE 30/500 ML
100 PLASTIC BAG, INJECTION (ML) INTRAVENOUS CONTINUOUS
Status: DISCONTINUED | OUTPATIENT
Start: 2020-11-13 | End: 2020-11-15 | Stop reason: HOSPADM

## 2020-11-13 RX ORDER — BUPIVACAINE HYDROCHLORIDE 7.5 MG/ML
INJECTION, SOLUTION INTRASPINAL PRN
Status: DISCONTINUED | OUTPATIENT
Start: 2020-11-13 | End: 2020-11-13

## 2020-11-13 RX ORDER — METHYLERGONOVINE MALEATE 0.2 MG/ML
200 INJECTION INTRAVENOUS
Status: DISCONTINUED | OUTPATIENT
Start: 2020-11-13 | End: 2020-11-15 | Stop reason: HOSPADM

## 2020-11-13 RX ORDER — EPHEDRINE SULFATE 50 MG/ML
5 INJECTION, SOLUTION INTRAMUSCULAR; INTRAVENOUS; SUBCUTANEOUS
Status: DISCONTINUED | OUTPATIENT
Start: 2020-11-13 | End: 2020-11-15 | Stop reason: HOSPADM

## 2020-11-13 RX ORDER — LIDOCAINE 40 MG/G
CREAM TOPICAL
Status: DISCONTINUED | OUTPATIENT
Start: 2020-11-13 | End: 2020-11-15 | Stop reason: HOSPADM

## 2020-11-13 RX ORDER — FENTANYL CITRATE 50 UG/ML
15 INJECTION, SOLUTION INTRAMUSCULAR; INTRAVENOUS ONCE
Status: DISCONTINUED | OUTPATIENT
Start: 2020-11-13 | End: 2020-11-15 | Stop reason: HOSPADM

## 2020-11-13 RX ORDER — ACETAMINOPHEN 325 MG/1
975 TABLET ORAL EVERY 6 HOURS
Status: CANCELLED | OUTPATIENT
Start: 2020-11-13

## 2020-11-13 RX ORDER — CITRIC ACID/SODIUM CITRATE 334-500MG
30 SOLUTION, ORAL ORAL
Status: DISCONTINUED | OUTPATIENT
Start: 2020-11-13 | End: 2020-11-14

## 2020-11-13 RX ORDER — MISOPROSTOL 200 UG/1
400 TABLET ORAL
Status: CANCELLED | OUTPATIENT
Start: 2020-11-13

## 2020-11-13 RX ORDER — ONDANSETRON 2 MG/ML
4 INJECTION INTRAMUSCULAR; INTRAVENOUS EVERY 6 HOURS PRN
Status: DISCONTINUED | OUTPATIENT
Start: 2020-11-13 | End: 2020-11-15 | Stop reason: HOSPADM

## 2020-11-13 RX ORDER — HYDROCORTISONE 2.5 %
CREAM (GRAM) TOPICAL 3 TIMES DAILY PRN
Status: CANCELLED | OUTPATIENT
Start: 2020-11-13

## 2020-11-13 RX ORDER — SODIUM CHLORIDE, SODIUM LACTATE, POTASSIUM CHLORIDE, CALCIUM CHLORIDE 600; 310; 30; 20 MG/100ML; MG/100ML; MG/100ML; MG/100ML
INJECTION, SOLUTION INTRAVENOUS CONTINUOUS
Status: DISCONTINUED | OUTPATIENT
Start: 2020-11-13 | End: 2020-11-14

## 2020-11-13 RX ORDER — NALBUPHINE HYDROCHLORIDE 20 MG/ML
2.5-5 INJECTION, SOLUTION INTRAMUSCULAR; INTRAVENOUS; SUBCUTANEOUS EVERY 6 HOURS PRN
Status: DISCONTINUED | OUTPATIENT
Start: 2020-11-13 | End: 2020-11-15 | Stop reason: HOSPADM

## 2020-11-13 RX ORDER — FENTANYL CITRATE-0.9 % NACL/PF 10 MCG/ML
PLASTIC BAG, INJECTION (ML) INTRAVENOUS CONTINUOUS PRN
Status: DISCONTINUED | OUTPATIENT
Start: 2020-11-13 | End: 2020-11-13

## 2020-11-13 RX ORDER — SIMETHICONE 80 MG
80 TABLET,CHEWABLE ORAL 4 TIMES DAILY PRN
Status: CANCELLED | OUTPATIENT
Start: 2020-11-13

## 2020-11-13 RX ORDER — DEXTROSE, SODIUM CHLORIDE, SODIUM LACTATE, POTASSIUM CHLORIDE, AND CALCIUM CHLORIDE 5; .6; .31; .03; .02 G/100ML; G/100ML; G/100ML; G/100ML; G/100ML
INJECTION, SOLUTION INTRAVENOUS CONTINUOUS
Status: CANCELLED | OUTPATIENT
Start: 2020-11-13

## 2020-11-13 RX ORDER — MISOPROSTOL 200 UG/1
TABLET ORAL PRN
Status: DISCONTINUED | OUTPATIENT
Start: 2020-11-13 | End: 2020-11-13

## 2020-11-13 RX ORDER — OXYCODONE HYDROCHLORIDE 5 MG/1
5 TABLET ORAL EVERY 4 HOURS PRN
Status: CANCELLED | OUTPATIENT
Start: 2020-11-13

## 2020-11-13 RX ORDER — CITRIC ACID/SODIUM CITRATE 334-500MG
30 SOLUTION, ORAL ORAL
Status: COMPLETED | OUTPATIENT
Start: 2020-11-13 | End: 2020-11-13

## 2020-11-13 RX ORDER — OXYCODONE HYDROCHLORIDE 5 MG/1
5 TABLET ORAL EVERY 4 HOURS PRN
Status: DISCONTINUED | OUTPATIENT
Start: 2020-11-13 | End: 2020-11-15 | Stop reason: HOSPADM

## 2020-11-13 RX ORDER — CLINDAMYCIN PHOSPHATE 900 MG/50ML
900 INJECTION, SOLUTION INTRAVENOUS
Status: COMPLETED | OUTPATIENT
Start: 2020-11-13 | End: 2020-11-13

## 2020-11-13 RX ORDER — ONDANSETRON 2 MG/ML
4 INJECTION INTRAMUSCULAR; INTRAVENOUS EVERY 6 HOURS PRN
Status: CANCELLED | OUTPATIENT
Start: 2020-11-13

## 2020-11-13 RX ORDER — CLINDAMYCIN PHOSPHATE 900 MG/50ML
900 INJECTION, SOLUTION INTRAVENOUS
Status: DISCONTINUED | OUTPATIENT
Start: 2020-11-13 | End: 2020-11-13

## 2020-11-13 RX ORDER — OXYTOCIN/0.9 % SODIUM CHLORIDE 30/500 ML
100 PLASTIC BAG, INJECTION (ML) INTRAVENOUS CONTINUOUS
Status: CANCELLED | OUTPATIENT
Start: 2020-11-13

## 2020-11-13 RX ORDER — AMOXICILLIN 250 MG
1 CAPSULE ORAL 2 TIMES DAILY
Status: DISCONTINUED | OUTPATIENT
Start: 2020-11-13 | End: 2020-11-15 | Stop reason: HOSPADM

## 2020-11-13 RX ORDER — OXYTOCIN 10 [USP'U]/ML
10 INJECTION, SOLUTION INTRAMUSCULAR; INTRAVENOUS
Status: DISCONTINUED | OUTPATIENT
Start: 2020-11-13 | End: 2020-11-15 | Stop reason: HOSPADM

## 2020-11-13 RX ORDER — OXYTOCIN/0.9 % SODIUM CHLORIDE 30/500 ML
PLASTIC BAG, INJECTION (ML) INTRAVENOUS CONTINUOUS PRN
Status: DISCONTINUED | OUTPATIENT
Start: 2020-11-13 | End: 2020-11-13

## 2020-11-13 RX ORDER — MORPHINE SULFATE 1 MG/ML
150 INJECTION, SOLUTION EPIDURAL; INTRATHECAL; INTRAVENOUS ONCE
Status: DISCONTINUED | OUTPATIENT
Start: 2020-11-13 | End: 2020-11-15 | Stop reason: HOSPADM

## 2020-11-13 RX ORDER — DEXTROSE, SODIUM CHLORIDE, SODIUM LACTATE, POTASSIUM CHLORIDE, AND CALCIUM CHLORIDE 5; .6; .31; .03; .02 G/100ML; G/100ML; G/100ML; G/100ML; G/100ML
INJECTION, SOLUTION INTRAVENOUS CONTINUOUS
Status: DISCONTINUED | OUTPATIENT
Start: 2020-11-13 | End: 2020-11-15 | Stop reason: HOSPADM

## 2020-11-13 RX ORDER — OXYTOCIN 10 [USP'U]/ML
10 INJECTION, SOLUTION INTRAMUSCULAR; INTRAVENOUS
Status: CANCELLED | OUTPATIENT
Start: 2020-11-13

## 2020-11-13 RX ORDER — CARBOPROST TROMETHAMINE 250 UG/ML
250 INJECTION, SOLUTION INTRAMUSCULAR
Status: DISCONTINUED | OUTPATIENT
Start: 2020-11-13 | End: 2020-11-15 | Stop reason: HOSPADM

## 2020-11-13 RX ORDER — IBUPROFEN 800 MG/1
800 TABLET, FILM COATED ORAL EVERY 6 HOURS
Status: DISCONTINUED | OUTPATIENT
Start: 2020-11-14 | End: 2020-11-15 | Stop reason: HOSPADM

## 2020-11-13 RX ORDER — HYDROCORTISONE 2.5 %
CREAM (GRAM) TOPICAL 3 TIMES DAILY PRN
Status: DISCONTINUED | OUTPATIENT
Start: 2020-11-13 | End: 2020-11-15 | Stop reason: HOSPADM

## 2020-11-13 RX ORDER — MODIFIED LANOLIN
OINTMENT (GRAM) TOPICAL
Status: CANCELLED | OUTPATIENT
Start: 2020-11-13

## 2020-11-13 RX ORDER — LIDOCAINE 40 MG/G
CREAM TOPICAL
Status: DISCONTINUED | OUTPATIENT
Start: 2020-11-13 | End: 2020-11-14

## 2020-11-13 RX ORDER — MODIFIED LANOLIN
OINTMENT (GRAM) TOPICAL
Status: DISCONTINUED | OUTPATIENT
Start: 2020-11-13 | End: 2020-11-15 | Stop reason: HOSPADM

## 2020-11-13 RX ORDER — OXYTOCIN/0.9 % SODIUM CHLORIDE 30/500 ML
340 PLASTIC BAG, INJECTION (ML) INTRAVENOUS CONTINUOUS PRN
Status: CANCELLED | OUTPATIENT
Start: 2020-11-13

## 2020-11-13 RX ORDER — NALOXONE HYDROCHLORIDE 0.4 MG/ML
.1-.4 INJECTION, SOLUTION INTRAMUSCULAR; INTRAVENOUS; SUBCUTANEOUS
Status: ACTIVE | OUTPATIENT
Start: 2020-11-13 | End: 2020-11-14

## 2020-11-13 RX ORDER — AMOXICILLIN 250 MG
2 CAPSULE ORAL 2 TIMES DAILY
Status: DISCONTINUED | OUTPATIENT
Start: 2020-11-13 | End: 2020-11-15 | Stop reason: HOSPADM

## 2020-11-13 RX ORDER — ONDANSETRON 2 MG/ML
INJECTION INTRAMUSCULAR; INTRAVENOUS PRN
Status: DISCONTINUED | OUTPATIENT
Start: 2020-11-13 | End: 2020-11-13

## 2020-11-13 RX ORDER — AMOXICILLIN 250 MG
2 CAPSULE ORAL 2 TIMES DAILY
Status: CANCELLED | OUTPATIENT
Start: 2020-11-13

## 2020-11-13 RX ADMIN — MORPHINE SULFATE 0.15 MG: 1 INJECTION EPIDURAL; INTRATHECAL; INTRAVENOUS at 08:54

## 2020-11-13 RX ADMIN — OXYTOCIN-SODIUM CHLORIDE 0.9% IV SOLN 30 UNIT/500ML 300 ML/HR: 30-0.9/5 SOLUTION at 09:25

## 2020-11-13 RX ADMIN — KETOROLAC TROMETHAMINE 30 MG: 30 INJECTION, SOLUTION INTRAMUSCULAR at 22:48

## 2020-11-13 RX ADMIN — CLINDAMYCIN PHOSPHATE 900 MG: 900 INJECTION, SOLUTION INTRAVENOUS at 09:03

## 2020-11-13 RX ADMIN — MISOPROSTOL 400 MCG: 200 TABLET ORAL at 09:37

## 2020-11-13 RX ADMIN — SODIUM CITRATE AND CITRIC ACID MONOHYDRATE 30 ML: 500; 334 SOLUTION ORAL at 08:20

## 2020-11-13 RX ADMIN — METHYLERGONOVINE MALEATE 200 MCG: 0.2 INJECTION INTRAMUSCULAR; INTRAVENOUS at 09:49

## 2020-11-13 RX ADMIN — DOCUSATE SODIUM AND SENNOSIDES 2 TABLET: 8.6; 5 TABLET ORAL at 20:36

## 2020-11-13 RX ADMIN — BUPIVACAINE 10 ML: 13.3 INJECTION, SUSPENSION, LIPOSOMAL INFILTRATION at 10:37

## 2020-11-13 RX ADMIN — ONDANSETRON 4 MG: 2 INJECTION INTRAMUSCULAR; INTRAVENOUS at 09:26

## 2020-11-13 RX ADMIN — BUPIVACAINE 10 ML: 13.3 INJECTION, SUSPENSION, LIPOSOMAL INFILTRATION at 10:43

## 2020-11-13 RX ADMIN — ACETAMINOPHEN 975 MG: 325 TABLET, FILM COATED ORAL at 20:35

## 2020-11-13 RX ADMIN — ACETAMINOPHEN 975 MG: 325 TABLET, FILM COATED ORAL at 13:40

## 2020-11-13 RX ADMIN — SODIUM CHLORIDE, POTASSIUM CHLORIDE, SODIUM LACTATE AND CALCIUM CHLORIDE: 600; 310; 30; 20 INJECTION, SOLUTION INTRAVENOUS at 08:41

## 2020-11-13 RX ADMIN — GENTAMICIN SULFATE 140 MG: 40 INJECTION, SOLUTION INTRAMUSCULAR; INTRAVENOUS at 08:56

## 2020-11-13 RX ADMIN — SODIUM CHLORIDE, POTASSIUM CHLORIDE, SODIUM LACTATE AND CALCIUM CHLORIDE: 600; 310; 30; 20 INJECTION, SOLUTION INTRAVENOUS at 09:52

## 2020-11-13 RX ADMIN — Medication 50 MCG/MIN: at 08:54

## 2020-11-13 RX ADMIN — Medication 100 ML/HR: at 11:57

## 2020-11-13 RX ADMIN — BUPIVACAINE HYDROCHLORIDE IN DEXTROSE 1.7 ML: 7.5 INJECTION, SOLUTION SUBARACHNOID at 08:54

## 2020-11-13 RX ADMIN — BUPIVACAINE HYDROCHLORIDE 10 ML: 2.5 INJECTION, SOLUTION EPIDURAL; INFILTRATION; INTRACAUDAL at 10:43

## 2020-11-13 RX ADMIN — PROCHLORPERAZINE EDISYLATE 10 MG: 5 INJECTION INTRAMUSCULAR; INTRAVENOUS at 12:17

## 2020-11-13 RX ADMIN — SODIUM CHLORIDE, POTASSIUM CHLORIDE, SODIUM LACTATE AND CALCIUM CHLORIDE: 600; 310; 30; 20 INJECTION, SOLUTION INTRAVENOUS at 07:09

## 2020-11-13 RX ADMIN — KETOROLAC TROMETHAMINE 30 MG: 30 INJECTION, SOLUTION INTRAMUSCULAR at 17:12

## 2020-11-13 RX ADMIN — ONDANSETRON 4 MG: 2 INJECTION INTRAMUSCULAR; INTRAVENOUS at 11:38

## 2020-11-13 RX ADMIN — BUPIVACAINE HYDROCHLORIDE 10 ML: 2.5 INJECTION, SOLUTION EPIDURAL; INFILTRATION; INTRACAUDAL at 10:37

## 2020-11-13 RX ADMIN — ACETAMINOPHEN 975 MG: 325 TABLET, FILM COATED ORAL at 08:19

## 2020-11-13 RX ADMIN — FENTANYL CITRATE 10 MCG: 50 INJECTION, SOLUTION INTRAMUSCULAR; INTRAVENOUS at 08:54

## 2020-11-13 ASSESSMENT — MIFFLIN-ST. JEOR: SCORE: 1696.05

## 2020-11-13 NOTE — PLAN OF CARE
Nausea controlled after 1217 dose of compazine. Bleeding is scant, firm at the U. Transferred via cart with infant in arms and SO w/belongings to the [facility] room  with 2 RN. Transferred to bed, hover mat removed and clean linens and pericare provided. VS assessed, WNL. SBAR to Leonarda PETERSON at bedside, where ID bands were also matched and verified between patient her infant.

## 2020-11-13 NOTE — OR NURSING
Repeat C/S, tob at 0924, received Pitocin after delivery of placenta, misoprostol 400 mcg buccal, and one dose of methergine by anes. Sutures/steristrips and abd bandage to close.  mL at end of procedure. TAP block performed in OR and then linens changed and skin assessment performed. Transferred to cart via hover kimberly and 4 staff assist. Transferred by anes and RN to PACU. VS obtained, EKG leads attached and pitocin infusing at 100 mL/hr. Crocker catheter emptied, 500 mL clear yellow urine at that time. Secured to left thigh with stat lock and crocker care with crocker wipes. Dermatomes are at patients navel. Slight nausea at beginning of recovery, then followed by about 200 mL clear emesis. Cool cloth provided. Second bout of emesis and 4 mg zofran IVP slowly administered. Second bag of pitocin is infusing at 100mL/hr. Bleeding continues to be scant, firm and at the U. Another bout of emesis, 10 mg of Compazine IVP slowly. Skin to skin with infant . Compazine has been effective in managing nausea. VSS, NSR. Room 46 in [facility].

## 2020-11-13 NOTE — DISCHARGE SUMMARY
DELIVERY DISCHARGE SUMMARY    Linnea M Schirmer  : 1985  MRN: [id number]    Admit date: 2020  Discharge date: 11/15/2020    Admit Dx:   - 35 year old  at 39w0d  - Rh negative  - history of gestational hypertension  - history of  x1 for breech  - hypothyroid  - failed GCT passed GTT    Discharge Dx:  - Same as above, s/p repeat low transverse  section    Procedures:  - Repeat low transverse  section with double layer closure via Pfannenstiel incision  - Spinal analgesia    Admit HPI:  Ms. Linnea M Schirmer is a 35 year old  at 39w0d who was admitted on 2020 for a scheduled repeat  section.   Please see her admit H&P for full details of her PMH, PSH, Meds, Allergies and exam on admit.    Hospital course:  After discussion of risk and and benefits and signing informed consent the patient was taken to the operating room for repeat  section.    Operative Findings:  1. Dense midline rectus fascial adhesions. Omentum adherent to anterior abdominal wall. Bladder adherent up to just above the level of the lower uterine segment. No omental-uterine adhesions noted.   2. Clear amniotic fluid  3. Liveborn female infant in cephalic presentation. Apgars 9 at 1 minute & 9 at 5 minutes. Weight pending.  4. Normal uterus, fallopian tubes, and ovaries.     EBL from the delivery was 922mL. Please see her  Section Operative Note for full details regarding her delivery.    Her postoperative course was complicated by post-op urinary retention requiring crocker replacement on POD#1. It was left in place for 12 hours and then removed without further urinary retention. On POD#2, she was meeting all of her postpartum goals and deemed stable for discharge. She was voiding without difficulty, tolerating a regular diet without nausea and vomiting, her pain was well controlled on oral pain medicines and her lochia was appropriate. Her hemoglobin prior to  delivery was 12.8 and after delivery was 11.4. Her Rh status was negative and Rhogam was given prior to discharge.       Discharge Medications:  Current Discharge Medication List      START taking these medications    Details   oxyCODONE (ROXICODONE) 5 MG tablet Take 1 tablet (5 mg) by mouth every 6 hours as needed for severe pain  Qty: 5 tablet, Refills: 0    Associated Diagnoses: H/O  section         CONTINUE these medications which have NOT CHANGED    Details   Ferrous Sulfate (IRON PO)       magnesium 250 MG tablet Take 1 tablet by mouth daily      Prenatal Vit-Fe Fumarate-FA (PRENATAL MULTIVITAMIN W/IRON) 27-0.8 MG tablet Take 1 tablet by mouth daily      VITAMIN D PO       acetaminophen (TYLENOL) 325 MG tablet Take 2 tablets (650 mg) by mouth every 6 hours as needed for mild pain Start after Delivery.  Qty: 100 tablet, Refills: 0    Associated Diagnoses: Supervision of high risk pregnancy, antepartum      ibuprofen (ADVIL/MOTRIN) 600 MG tablet Take 1 tablet (600 mg) by mouth every 6 hours as needed for moderate pain Start after delivery  Qty: 60 tablet, Refills: 0    Associated Diagnoses: Supervision of high risk pregnancy, antepartum      senna-docusate (SENOKOT-S/PERICOLACE) 8.6-50 MG tablet Take 1 tablet by mouth daily Start after delivery.  Qty: 100 tablet, Refills: 0    Associated Diagnoses: Supervision of high risk pregnancy, antepartum         STOP taking these medications       ASPIRIN PO Comments:   Reason for Stopping:               Discharge/Disposition:  Linnea M Schirmer was discharged to home in stable condition with the following instructions/medications:  1) Call for temperature > 100.4, bright red vaginal bleeding >1 pad an hour x 2 hours, foul smelling vaginal discharge, pain not controlled by usual oral pain meds, persistent nausea and vomiting not controlled on medications, drainage or redness from incision site  2) She declined contraception.  3) For feeding she decided to  breastfeed and supplement with formula.  4) She was instructed to follow-up with her primary OB in 6 weeks for a routine postpartum visit.  5) Discharge activity:  No heavy lifting >15 lbs or strenuous activity for 6 weeks, pelvic rest for 6 weeks, no driving or operating machinery while on narcotics.      Krystin Alberts MD  OBGYN PGY-1  11:23 AM November 15, 2020

## 2020-11-13 NOTE — ANESTHESIA CARE TRANSFER NOTE
Patient: Jennifer DIXON Schirmer    Procedure(s):   SECTION    Diagnosis: H/O  section [Z98.891]  Diagnosis Additional Information: No value filed.    Anesthesia Type:   MAC, Spinal, Peripheral Nerve Block, For Post-op pain in coordination with surgeon     Note:  Airway :Room Air  Patient transferred to:PACU  Handoff Report: Identifed the Patient, Identified the Reponsible Provider, Reviewed the pertinent medical history, Discussed the surgical course, Reviewed Intra-OP anesthesia mangement and issues during anesthesia, Set expectations for post-procedure period and Allowed opportunity for questions and acknowledgement of understanding      Vitals: (Last set prior to Anesthesia Care Transfer)    CRNA VITALS  2020 1019 - 2020 1051      2020             Pulse:  73    Ht Rate:  71    SpO2:  100 %                Electronically Signed By: Thierry Espana MD  2020  10:51 AM

## 2020-11-13 NOTE — ANESTHESIA POSTPROCEDURE EVALUATION
Anesthesia POST Procedure Evaluation    Patient: Linnea M Schirmer   MRN:     [id number] Gender:   female   Age:    35 year old :      1985        Preoperative Diagnosis: H/O  section [Z98.891]   Procedure(s):   SECTION   Postop Comments: No value filed.     Anesthesia Type: MAC, Spinal, Peripheral Nerve Block, For Post-op pain in coordination with surgeon       Disposition: Admission   Postop Pain Control: Uneventful            Sign Out: Well controlled pain   PONV: No   Neuro/Psych: Uneventful            Sign Out: Acceptable/Baseline neuro status   Airway/Respiratory: Uneventful            Sign Out: Acceptable/Baseline resp. status   CV/Hemodynamics: Uneventful            Sign Out: Acceptable CV status   Other NRE: NONE   DID A NON-ROUTINE EVENT OCCUR? No         Last Anesthesia Record Vitals:  CRNA VITALS  2020 1019 - 2020 1119      2020             Pulse:  73    Ht Rate:  71    SpO2:  100 %    EKG:  Sinus rhythm          Last PACU Vitals:  Vitals Value Taken Time   /71 20 1240   Temp 37  C (98.6  F) 20 1240   Pulse 72 20 1249   Resp 17 20 1249   SpO2 95 % 20 1249   Temp src     NIBP     Pulse     SpO2     Resp     Temp     Ht Rate     Temp 2     Vitals shown include unvalidated device data.      Electronically Signed By: Zeny Sosa MD, 2020, 4:34 PM

## 2020-11-13 NOTE — H&P
"L&D History and Physical   2020  Linnea M Schirmer  [id number]    Admission Date: 2020   PCP: No Ref-Primary, Physician     HPI: Linnea M Schirmer is a 35 year old  at 39w0d by LMP c/w 11w0d us, here for scheduled repeat  section.    Patient states that she is feeling well today.  She denies feeling any contractions, having vaginal bleeding, or loss of fluid.  She endorses normal fetal movement    Pregnancy notable for:  - Rh negative  -history of gestational hypertension  -history of  x1 for breech  -hypothyroid  -failed GCT passed GTT    OBHX:   OB History    Para Term  AB Living   2 1 1 0 0 1   SAB TAB Ectopic Multiple Live Births   0 0 0 0 1      # Outcome Date GA Lbr Jose/2nd Weight Sex Delivery Anes PTL Lv   2 Current            1 Term 08/15/16 37w5d  3.07 kg (6 lb 12.3 oz) F -SEC   AMBER       MedicalHX:   Past Medical History:   Diagnosis Date     Migraines        SurgicalHX:   Past Surgical History:   Procedure Laterality Date     CYSTECTOMY OVARIAN BENIGN         Medications:   No current facility-administered medications on file prior to encounter.        ASPIRIN PO, Take 81 mg by mouth       Ferrous Sulfate (IRON PO),        magnesium 250 MG tablet, Take 1 tablet by mouth daily       Prenatal Vit-Fe Fumarate-FA (PRENATAL MULTIVITAMIN W/IRON) 27-0.8 MG tablet, Take 1 tablet by mouth daily       VITAMIN D PO,         Allergies:   Allergies   Allergen Reactions     Penicillins      hives       SocialHX:No tobacco, alcohol, or drug use in pregnancy  ROS: 10-point ROS negative except as in HPI     Physical Exam:  Patient Vitals for the past 24 hrs:   BP Temp Temp src Resp Height Weight   20 0659 -- -- -- -- 1.727 m (5' 8\") 95.3 kg (210 lb)   20 0656 125/84 98.3  F (36.8  C) Oral 18 -- --   ]  GEN: resting comfortably in bed, no acute distress  CV: regular rate and rhythm, no murmurs  PULM: clear to auscultation bilaterally, no increased " work of breathing, no cough/wheeze   ABD: soft, gravid, non-tender, non-distended  EXT: no edema, non tender to palpation  Presentation: cephalic by BSUS  EFW: 8 lbs    NST:  FHT: baseline 140, moderate variability, + accels, no decels  TOCO: 2/10        Lab Results   Component Value Date    ABO B 11/10/2020    RH Neg 11/10/2020    AS Pos (A) 11/10/2020    HEPBANG Nonreactive 2020    HGB 12.8 11/10/2020       GBS Status:   Lab Results   Component Value Date    GBS Negative 10/22/2020       COVID negative    A/P: Linnea M Schirmer is a 35 year old  at 39w0d by LMP c/w 11w0d us, here for scheduled repeat  section.    Scheduled Repeat  Section:  Indicated due to history of prior CS. Prior  for breech presentation.  Will plan for a pfannenstiel skin incision with low transverse hysterotomy  - Labs: CBC, T&S, RPR               - Pre-op Hgb 12.8, Plts 221               - Antibody positive with anti-D antibody, likely due to Rhogam  - Placenta: anterior   - Anesthesia: Spinal   - allergy to PCN, thus will plan gentamicin and clindamycin  - PPH Meds/Ppx: no contraindications to uterotonic   - Diet: NPO  - PPx: SCDs, plan for lovenox if blood loss > 1000 mL   - Consent: Discussed indications, alternatives, benefits, and risks of surgery including bleeding requiring uterotonics, transfusion or additional procedures; injury to surrounding organs including the bowel, bladder, blood vessels, ureters, and nerves.     History of gHTN:  BP WNL today  - Blood pressures normal in this pregnancy  - Monitor closely in postpartum period      PNC  - Rh negative, s/p third trimester Rhogam, plan for PP Rhogam evaluation   - Rubella immune, , GTT WNL, GBS negative  - Other prenatal labs wnl  - S/p flu, Tdap vaccines              FWB: Category 1, reactive    Samanta Baxter MD  2020 7:50 AM

## 2020-11-13 NOTE — PLAN OF CARE
Pt here for repeat c/s. Pre-op cares started. EFM monitors applied. Pt denies contractions, LOF, bleeding and reports active fetal movement. Last solid food intake at 11/12 @ 2230 and pt states she drank her gatorade for ERAS pathway at 0530 this morning. SCDs on and running, IV placed and running with LR at 200mL/hr. Report given to NIRU Low RN. Continue with Pre-op cares.

## 2020-11-13 NOTE — ANESTHESIA PROCEDURE NOTES
Spinal/LP Procedure Note    Spinal Block      Staff -   Anesthesiologist:  Zeny Velarde MD  Resident/Fellow: Thierry Espana MD  Performed By: resident  Procedure performed by resident/CRNA in presence of a teaching physician.    Location: OB and OR  Procedure Start/Stop Times:      11/13/2020 8:45 AM     11/13/2020 8:55 AM    Correct Patient: Yes      Correct Position: Yes      Correct Site: Yes      Correct Procedure: Yes      Correct Laterality:  Yes    Site Marked:  Yes  Procedure:     Procedure:  Intrathecal    ASA:  2    Diagnosis:  C/s    Position:  Sitting    Sterile Prep: chloraprep, mask, sterile gloves and patient draped      Insertion site:  L3-4    Approach:  Midline    Needle Type:  Pencan    Needle gauge (G):  25    Local Skin Infiltration:  1% lidocaine    amount (ml):  3    Needle Length (in):  3.5    Introducer used: Yes      Introducer gauge:  20 G    Attempts:  1    Redirects:  3    CSF:  Clear    Paresthesias:  No    Time injected:  08:54

## 2020-11-13 NOTE — LETTER
November 23, 2020      Linnea M Schirmer  [address]  [place] [address]        Dear Ms.Schirmer,    We are writing to inform you of your test results.    {results letter list:[id number]}    No results found from the In Basket message.    If you have any questions or concerns, please call the clinic at the number listed above.       Sincerely,        No name on file.

## 2020-11-13 NOTE — ADDENDUM NOTE
Addendum  created 11/13/20 1639 by Zeny Velarde MD    Attestation recorded in Intraprocedure, Intraprocedure Attestations filed

## 2020-11-13 NOTE — PROGRESS NOTES
Preop prep finished. CHELSEA and Dr. AUSTIN Baxter have been bedside and signed consents with patient. Labs completed, and BS is 76. Bicitra, Tylenol administered. Clipped and Simone prep completed. SO is bedside, provided with outfit for OR. SCDs placed, LR infusing, allergy, fall, ID band on patient. Category I tracing, with 4-8 min ctx pattern, pt denies feeling. Abdomen palpated soft.

## 2020-11-13 NOTE — BRIEF OP NOTE
[facility]  Full Operative Progress Note     Surgery Date:  2020    Surgeon:  Samanta Baxter MD     Assistants:  Maya Schwartz MD, PGY-2    Pre-op Diagnosis:  Intrauterine pregnancy at 39w0d     History of  delivery      Post-op Diagnosis:  Same      Liveborn female infant     Procedure:  Repeat low-transverse  section with double layer uterine closure via pfannenstiel incision    Anesthesia: Spinal     EBL:  922 mL    IVF:  900 mL crystalloid    UOP:  400 mL clear urine at the end of the case    Drains: Isidro Catheter     Specimens:  None    Complications: None apparent    Indications:   Linnea M Schirmer is a 35 year old  at 39w0d admitted for repeat  delivery.  The risks, benefits, and alternatives of  section were discussed with the patient, and she agreed to proceed.     Findings:   1. Dense midline rectus fascial adhesions. Omentum adherent to anterior abdominal wall. Bladder adherent up to just above the level of the lower uterine segment. No omental-uterine adhesions noted.   2. Clear amniotic fluid  3. Liveborn female infant in cephalic presentation. Apgars 9 at 1 minute & 9 at 5 minutes. Weight pending.  4. Normal uterus, fallopian tubes, and ovaries.     Maya Schwartz MD  Obstetrics & Gynecology, PGY-2  20 10:49 AM

## 2020-11-13 NOTE — OP NOTE
[facility]  Full Operative Progress Note     Surgery Date:  2020    Surgeon:  Samanta Baxter MD     Assistants:  Maya Schwartz MD, PGY-2    Pre-op Diagnosis:  Intrauterine pregnancy at 39w0d     History of  delivery      Post-op Diagnosis:  Same      Liveborn female infant     Procedure:  Repeat low-transverse  section with double layer uterine closure via pfannenstiel incision    Anesthesia: Spinal     QBL:  922 mL    IVF:  900 mL crystalloid    UOP:  400 mL clear urine at the end of the case    Drains: Isidro Catheter     Specimens:  None    Complications: None apparent    Intraoperative Medications: Methergine 0.2 mg IM, misoprostol 400 mcg buccal , IV pitocin     Indications:   Linnea M Schirmer is a 35 year old  at 39w0d admitted for repeat  delivery.  The risks, benefits, and alternatives of  section were discussed with the patient, and she agreed to proceed.     Findings:   1. Dense midline rectus fascial adhesions. Omentum adherent to anterior abdominal wall. Bladder adherent up to just above the level of the lower uterine segment. No omental-uterine adhesions noted.   2. Clear amniotic fluid  3. Liveborn female infant in cephalic presentation. Apgars 9 at 1 minute & 9 at 5 minutes. Weight 8lbs 11oz.  4. Normal appearing uterus, bilateral fallopian tubes, and bilateral ovaries.     Procedure Details:   The patient was brought to the OR, where adequate spinal anesthesia was administered.  Isidro catheter placed.  She was placed in the dorsal supine position with a slight leftward tilt. She was prepped and draped in the usual sterile fashion. A surgical time out was performed. A pfannenstiel skin incision was made with the scalpel, and carried down to the underlying fascia with sharp and blunt dissection. The fascia was incised in the midline, and the incision was extended laterally with the Canseco scissors. The superior aspect of the  fascia was grasped with the Kocher clamps and dissected off of the underlying rectus muscles with blunt and sharp dissection using a scalpel. Attention was then turned to the inferior aspect of the fascia, which was dissected off of the underlying rectus muscles with a combination of sharp and blunt dissection. The midline rectus fascia with dense adhesions, so a Kaleigh clamp was used to take it down in layers with Metzenbaum scissors. This was performed until the peritoneum was entered. The opening was extended with digital pressure and sharp dissection. The bladder blade was placed. The vesicouterine peritoneum was incised in the midline, and the incision was extended laterally with the Metzenbaum scissors. A bladder flap was created digitally and the bladder blade was replaced. A transverse hysterotomy was made with the scalpel in the lower uterine segment, and the incision was extended with digital pressure. The infant was noted to be in the cephalic position and was delivered atraumatically. The shoulders delivered easily. A nuchal cord x 2 was noted. The cord was doubly clamped and cut after 60 seconds and the infant was handed off to the awaiting nursery staff. A segment of cord was cut and collected. The placenta was delivered with gentle traction on the umbilical cord and uterine massage. The uterus was exteriorized and cleared of all clots and debris. Uterine tone was noted to be firm with pitocin given through the running IV and uterine massage.  The hysterotomy was closed with a running locked suture of 0 Vicryl.  The hysterotomy was then imbricated using an 0 Monocryl suture. Uterine tone was noted to be intermittently suboptimal, so both misoprostol and methergine were given.  The hysterotomy was noted to be hemostatic. The posterior cul-de-sac was cleared of all clots and debris. The uterus was returned to the abdomen and the pericolic gutters were cleared of all clots and debris. The hysterotomy was  reexamined and noted to be hemostatic. The fascia and rectus muscles were examined and areas of oozing were controlled with electrocautery.  Few areas of oozing on near the bladder peritoneum, thus surgiflo and pressure were applied with good hemostasis. The fascia was closed with a running 0 Vicryl suture. The subcutaneous tissue was irrigated and areas of oozing were controlled with electrocautery. The subcutaneous tissue was greater than 2 cm in thickness, and was therefore closed closed. The skin was closed with 4-0 Monocryl running subQ, steri strips and covered with a sterile dressing.    All sponge, needle, and instrument counts were correct. The patient tolerated the procedure well, and was transferred to recovery in stable condition. Dr. Samanta Baxter was present and scrubbed for the entirety of the procedure.     Maya Schwartz MD  Obstetrics & Gynecology, PGY-2  11/13/20 10:46 AM     Physician Attestation   I spent a total of 70 minutes with the patient, personally assisting as the resident complete RLTCS.     Samanta Baxter  Date of Service (when I saw the patient): 11/13/2020

## 2020-11-14 LAB
BLOOD BANK CMNT PATIENT-IMP: NORMAL
HGB BLD-MCNC: 11.4 G/DL (ref 11.7–15.7)

## 2020-11-14 PROCEDURE — 36415 COLL VENOUS BLD VENIPUNCTURE: CPT | Performed by: STUDENT IN AN ORGANIZED HEALTH CARE EDUCATION/TRAINING PROGRAM

## 2020-11-14 PROCEDURE — 86900 BLOOD TYPING SEROLOGIC ABO: CPT | Performed by: OBSTETRICS & GYNECOLOGY

## 2020-11-14 PROCEDURE — 36415 COLL VENOUS BLD VENIPUNCTURE: CPT | Performed by: OBSTETRICS & GYNECOLOGY

## 2020-11-14 PROCEDURE — 250N000013 HC RX MED GY IP 250 OP 250 PS 637: Performed by: STUDENT IN AN ORGANIZED HEALTH CARE EDUCATION/TRAINING PROGRAM

## 2020-11-14 PROCEDURE — 120N000002 HC R&B MED SURG/OB UMMC

## 2020-11-14 PROCEDURE — 250N000011 HC RX IP 250 OP 636: Performed by: STUDENT IN AN ORGANIZED HEALTH CARE EDUCATION/TRAINING PROGRAM

## 2020-11-14 PROCEDURE — 85461 HEMOGLOBIN FETAL: CPT | Performed by: OBSTETRICS & GYNECOLOGY

## 2020-11-14 PROCEDURE — 86901 BLOOD TYPING SEROLOGIC RH(D): CPT | Performed by: OBSTETRICS & GYNECOLOGY

## 2020-11-14 PROCEDURE — 85018 HEMOGLOBIN: CPT | Performed by: STUDENT IN AN ORGANIZED HEALTH CARE EDUCATION/TRAINING PROGRAM

## 2020-11-14 RX ADMIN — SIMETHICONE 80 MG: 80 TABLET, CHEWABLE ORAL at 19:54

## 2020-11-14 RX ADMIN — IBUPROFEN 800 MG: 800 TABLET ORAL at 23:38

## 2020-11-14 RX ADMIN — DOCUSATE SODIUM AND SENNOSIDES 2 TABLET: 8.6; 5 TABLET ORAL at 19:53

## 2020-11-14 RX ADMIN — ACETAMINOPHEN 975 MG: 325 TABLET, FILM COATED ORAL at 08:24

## 2020-11-14 RX ADMIN — KETOROLAC TROMETHAMINE 30 MG: 30 INJECTION, SOLUTION INTRAMUSCULAR at 05:01

## 2020-11-14 RX ADMIN — ACETAMINOPHEN 975 MG: 325 TABLET, FILM COATED ORAL at 01:39

## 2020-11-14 RX ADMIN — ACETAMINOPHEN 975 MG: 325 TABLET, FILM COATED ORAL at 14:42

## 2020-11-14 RX ADMIN — IBUPROFEN 800 MG: 800 TABLET ORAL at 17:32

## 2020-11-14 RX ADMIN — ACETAMINOPHEN 975 MG: 325 TABLET, FILM COATED ORAL at 23:38

## 2020-11-14 RX ADMIN — DOCUSATE SODIUM AND SENNOSIDES 2 TABLET: 8.6; 5 TABLET ORAL at 08:25

## 2020-11-14 RX ADMIN — HUMAN RHO(D) IMMUNE GLOBULIN 300 MCG: 300 INJECTION, SOLUTION INTRAMUSCULAR at 12:13

## 2020-11-14 RX ADMIN — IBUPROFEN 800 MG: 800 TABLET ORAL at 11:03

## 2020-11-14 NOTE — PROVIDER NOTIFICATION
11/14/20 0217   Provider Notification   Provider Name/Title Dick   Method of Notification Electronic Page   Request Evaluate-Remote   Notification Reason Status Update  (pt second attempt to void unsuccessful already straight cath)     pt second attempt to void unsuccessful already straight cathed x1. scan for 415. should I place crocker?        Page return  : Place crocker and MD will re-evaluate in AM rounds  
200 mL clear emesis. Oral care provided. Will reassess BP.  
Arm bent and holding infant in air to hand to FOB for diaper change. BP cuff readjusted and arm rested and reassessed. Repeat /85  
Bedside US. Discussed poc  
Discussed anes with patient.  
Second bout of nausea with emesis. 4 mg Zofran administered IVP slowly. Se and indications discussed.  
Signing consent.  
ADMIT

## 2020-11-14 NOTE — PROGRESS NOTES
Postpartum Note  , POD#1, RLTCS @ 39w0d    S: Pain well controlled with current pain meds. Lochia minimal.  Eating and drinking without nausea/vomiting. Ambulating without lightheadedness or dizziness.  Breast feeding.      O:  Patient Vitals for the past 24 hrs:   BP Temp Temp src Pulse Resp SpO2   20 0829 107/68 -- -- -- 18 96 %   20 0700 113/80 98.2  F (36.8  C) Oral 91 18 95 %   20 0255 114/73 98.8  F (37.1  C) Oral 107 18 93 %   20 2229 110/71 98.2  F (36.8  C) Oral 77 18 99 %   20 1817 108/81 98.5  F (36.9  C) Oral 87 18 96 %   20 1711 117/74 -- Oral 74 -- 97 %   20 1545 126/83 -- -- 83 18 98 %   20 1453 113/75 98.3  F (36.8  C) Oral 67 18 97 %   20 1340 115/76 -- -- 69 18 94 %   20 1310 113/77 98.3  F (36.8  C) Oral 73 18 95 %   ]  Gen:  NAD  CV: regular rate and rhythm  Resp: CTAB, good air movement  Abd: soft, nondistended, nontender, fundus firm at 2cm below umbilicus  Incision: Sterile abdominal dressing in place  Ext: non-tender, trace edema, no erythema    Hemoglobin   Date Value Ref Range Status   2020 11.4 (L) 11.7 - 15.7 g/dL Final   11/10/2020 12.8 11.7 - 15.7 g/dL Final     A/P: 35 year old  POD#1 s/p RLTCS, doing well postpartum.    Routine postpartum care  - Pain control: s/p TAP blocks. Pain is well-controlled with Tylenol, Motrin and oxycodone, continue.  - Heme: Appropriate blood loss during surgery. No s/s of ongoing blood loss. Continue to monitor vitals. Repeat hgb as clinically indicated.  - GI: Scheduled senna BID, simethicone TID. Prn miralax, suppository, anti-emetics  - : Postoperative urinary retention: Isidro catheter was replaced on  at 0300.  We will keep Isidro catheter until 1500 today and perform passive trial of void.  - PNC: Rh neg - will proceed with rhogam eval, Rubella immune  - Breat feeding; no issues  - Contraception: Declined. Discussed recommended 18 month spacing prior to next  pregnancy.  - PPx: Encourage ambulation, IS, SCDs while confined to bed    Josee Tripp MD ([email or web address])  N OBGYN PGY-3  OB G2 pager: [phone]  OB G3 pager: [phone]  2020      Physician Attestation   I, Samanta Baxter MD, personally examined and evaluated this patient.  I discussed the patient with the resident/fellow and care team, and agree with the assessment and plan of care as documented in the note of 2020.      I personally reviewed vital signs, medications and labs.    Roque findings: Linnea Schirmer is a 35 year old  POD #1 s/p scheduled RLTCS, currently recovering well from procedure.  Patient did have post-op urinary retention and crocker catheter was re-inserted.  Plan for removal this afternoon with repeat trial of void.  Post-op Hgb 11.4, which is appropriate decrease for operative blood loss.  Asymptomatic from acute blood loss anemia, surgical.  Plan discharge to home POD#2-3.  Samanta Baxter MD  Date of Service (when I saw the patient): 20

## 2020-11-14 NOTE — PLAN OF CARE
Data: Vital signs within normal limits. Postpartum checks within normal limits - see flow record. Patient eating and drinking normally. Isidro removed at 1500, patient reported she voided and had a bowel movement, missed the hat, reported a second void, educated to call upon next void and will check a PVR. No apparent signs of infection. Incision healing well. Rhogam given today. Patient performing self cares and is able to care for infant.  Action: Patient medicated during the shift for pain. See MAR. Patient reassessed within 1 hour after each medication and pain was improved - patient stated she was comfortable.  Response: Positive attachment behaviors observed with infant. Support persons  present.   Plan: Anticipate discharge on 1-2 days.

## 2020-11-14 NOTE — PLAN OF CARE
Data: Patient arrive to the floor around 1300, Vital signs within normal limits. Postpartum checks within normal limits - see flow record. Patient eating and drinking normally. Patient crocker removed 1500, due to void.No apparent signs of infection. Episiotomy covered, dressing CDI.   Action: Patient medicated during the shift for pain and cramping. See MAR. Patient reassessed within 1 hour after each medication and pain was improved - patient stated she was comfortable.  Response: Positive attachment behaviors observed with infant. Support persons  present.   Plan: Continue POC.

## 2020-11-14 NOTE — PLAN OF CARE
Data: Vital signs within normal limits. Postpartum checks within normal limits - see flow record. Patient eating and drinking normally. Patient unable to empty bladder x1 straight cath and orders for crocker placement. MD will re-evaluate this AM pt is up ambulating. No apparent signs of infection. Incision healing well. Patient performing self cares and is able to care for infant.  Action: Patient medicated during the shift for pain and cramping. See MAR. Patient reassessed within 1 hour after each medication and pain was improved - patient stated she was comfortable. Patient education done about benefits of breastfeeding vs. Bottle feeding. Mom and dad stated last pregnancy moms milk took a week to come in. So they asked for formula.  See flow record.  Response: Positive attachment behaviors observed with infant. Support persons  present.   Plan: Anticipate discharge TBD- would benefit from lactation consult

## 2020-11-15 VITALS
HEIGHT: 68 IN | RESPIRATION RATE: 18 BRPM | BODY MASS INDEX: 31.83 KG/M2 | TEMPERATURE: 97.9 F | WEIGHT: 210 LBS | DIASTOLIC BLOOD PRESSURE: 79 MMHG | SYSTOLIC BLOOD PRESSURE: 120 MMHG | OXYGEN SATURATION: 96 % | HEART RATE: 79 BPM

## 2020-11-15 LAB
ABO + RH BLD: NORMAL
ABO + RH BLD: NORMAL
BLOOD BANK CMNT PATIENT-IMP: NORMAL
DATE RH IMM GL GVN: NORMAL
FETAL CELL SCN BLD QL ROSETTE: NORMAL
RH IG VIALS RECOM PATIENT: NORMAL

## 2020-11-15 PROCEDURE — 250N000013 HC RX MED GY IP 250 OP 250 PS 637: Performed by: STUDENT IN AN ORGANIZED HEALTH CARE EDUCATION/TRAINING PROGRAM

## 2020-11-15 RX ORDER — OXYCODONE HYDROCHLORIDE 5 MG/1
5 TABLET ORAL EVERY 6 HOURS PRN
Qty: 5 TABLET | Refills: 0 | Status: SHIPPED | OUTPATIENT
Start: 2020-11-15 | End: 2022-03-16

## 2020-11-15 RX ADMIN — IBUPROFEN 800 MG: 800 TABLET ORAL at 06:03

## 2020-11-15 RX ADMIN — ACETAMINOPHEN 975 MG: 325 TABLET, FILM COATED ORAL at 06:02

## 2020-11-15 RX ADMIN — SIMETHICONE 80 MG: 80 TABLET, CHEWABLE ORAL at 08:40

## 2020-11-15 RX ADMIN — IBUPROFEN 800 MG: 800 TABLET ORAL at 12:11

## 2020-11-15 RX ADMIN — DOCUSATE SODIUM AND SENNOSIDES 1 TABLET: 8.6; 5 TABLET ORAL at 08:41

## 2020-11-15 RX ADMIN — ACETAMINOPHEN 975 MG: 325 TABLET, FILM COATED ORAL at 12:11

## 2020-11-15 NOTE — PROGRESS NOTES
"[facility]     Obstetrics Post-Op / Progress Note    Assessment & Plan   Assessment:  -2 Days Post-Op  Procedure(s):   SECTION    Doing well.  No excessive bleeding  Pain well-controlled.    Plan:  Discharge later today  Will send with oxycodone #5 tablets, has not needed here but remembers needing them at home after last c/s.   Discharge instructions reviewed and questions answered. Discussed either virtual or in person pp appt in 6-8 weeks.    MELITON DEXTER     Interval History   Doing well.  Pain is well-controlled.  No fevers.  No history of wound drainage, warmth or significant erythema.  Good appetite.  Denies chest pain, shortness of breath, nausea or vomiting.  Ambulatory.  Breastfeeding well.    Medications     - MEDICATION INSTRUCTIONS -       bupivacaine liposome (EXPAREL) LONG ACTING injection was administered into the infiltration site to produce postsurgical analgesia. Duration of action is up to 72 hours, and other \"carmen\" medications should not be given for 96 hours with the exception of the lidocaine 5% patch (LIDODERM) and the lidocaine 10mg in potassium infusions. This entry is for INFORMATION ONLY.       dextrose 5% lactated ringers       - MEDICATION INSTRUCTIONS -       - MEDICATION INSTRUCTIONS -       - MEDICATION INSTRUCTIONS -       - MEDICATION INSTRUCTIONS -       oxytocin in 0.9% NaCl 100 mL/hr (20 1157)     oxytocin in 0.9% NaCl         acetaminophen  975 mg Oral Q6H     fentaNYL  15 mcg Intrathecal Once     ibuprofen  800 mg Oral Q6H     morphine (PF)  150 mcg Intrathecal Once     senna-docusate  1 tablet Oral BID    Or     senna-docusate  2 tablet Oral BID     sodium chloride (PF)  3 mL Intracatheter Q8H     sodium chloride (PF)  3 mL Intracatheter Q8H       Physical Exam   Temp: 98.1  F (36.7  C) Temp src: Oral BP: 120/79 Pulse: 72   Resp: 17   O2 Device: None (Room air)    Vitals:    20 0659   Weight: 95.3 kg " (210 lb)     Vital Signs with Ranges  Temp:  [98.1  F (36.7  C)-98.4  F (36.9  C)] 98.1  F (36.7  C)  Pulse:  [68-81] 72  Resp:  [16-18] 17  BP: (117-127)/(71-85) 120/79  I/O last 3 completed shifts:  In: -   Out: 1025 [Urine:1025]    Uterine fundus is firm, non-tender and at the level of the umbilicus  Incision C/D/I    Data   Recent Labs   Lab Test 11/14/20  0719 11/10/20  1537   ABO B B   RH Neg Neg   AS  --  Pos*     Recent Labs   Lab Test 11/14/20  0719 11/10/20  1537   HGB 11.4* 12.8     Recent Labs   Lab Test 04/30/20  1536   RUQIGG 68

## 2020-11-15 NOTE — PLAN OF CARE
VSS, postpartum assessment WNL, voiding well and reports loose stools last noc.  Pain managed with motrin and tylenol.  Breastfeeding independently. Encouraged to complete EDS/birth certificate and watch videos.  Pt denies questions or concerns.  Will continue to monitor.

## 2020-11-15 NOTE — PLAN OF CARE
Vital signs within normal limits. Postpartum checks within normal limits - see flow record. Patient eating and drinking normally. Patient able to empty bladder independently and is up ambulating. No apparent signs of infection. healing well. Patient performing self cares and is able to care for infant. Denies pain, SOB, chest pain, headache, dizziness or lightheadedness, vision disturbance. Pt met discharge protocol. Follow up with appointments  with PCP discussed with patient and Spouse. Verbalized understanding of discharge teaching and had pt to teach back. Pt discharged home accompanied by her  and spouse.

## 2021-01-21 ENCOUNTER — PRENATAL OFFICE VISIT (OUTPATIENT)
Dept: OBGYN | Facility: CLINIC | Age: 36
End: 2021-01-21
Payer: COMMERCIAL

## 2021-01-21 VITALS
BODY MASS INDEX: 28.59 KG/M2 | WEIGHT: 188 LBS | SYSTOLIC BLOOD PRESSURE: 127 MMHG | HEART RATE: 100 BPM | DIASTOLIC BLOOD PRESSURE: 92 MMHG | TEMPERATURE: 97.5 F

## 2021-01-21 DIAGNOSIS — Z30.09 BIRTH CONTROL COUNSELING: ICD-10-CM

## 2021-01-21 DIAGNOSIS — K64.4 EXTERNAL HEMORRHOIDS: ICD-10-CM

## 2021-01-21 PROBLEM — O26.899 RH NEGATIVE STATE IN ANTEPARTUM PERIOD: Status: RESOLVED | Noted: 2020-05-01 | Resolved: 2021-01-21

## 2021-01-21 PROBLEM — Z67.91 RH NEGATIVE STATE IN ANTEPARTUM PERIOD: Status: RESOLVED | Noted: 2020-05-01 | Resolved: 2021-01-21

## 2021-01-21 PROCEDURE — 99207 PR POST PARTUM EXAM: CPT | Performed by: OBSTETRICS & GYNECOLOGY

## 2021-01-21 NOTE — PATIENT INSTRUCTIONS
Patient Education     Norethindrone tablets (contraception)  Brand Names: Josefina, Deblitane 28-Day, Sweta, Kellen, Jencycla, Jolivette, Lyza, Flakita-BE, Norlyroc, Nor-QD, Ortho Micronor, Sharobel 28-Day  What is this medicine?  NORETHINDRONE (nor eth IN drone) is an oral contraceptive. The product contains a female hormone known as a progestin. It is used to prevent pregnancy.  How should I use this medicine?  Take this medicine by mouth with a glass of water. You may take it with or without food. Follow the directions on the prescription label. Take this medicine at the same time each day and in the order directed on the package. Do not take your medicine more often than directed.  Contact your pediatrician regarding the use of this medicine in children. Special care may be needed. This medicine has been used in female children who have started having menstrual periods.  A patient package insert for the product will be given with each prescription and refill. Read this sheet carefully each time. The sheet may change frequently.  What side effects may I notice from receiving this medicine?  Side effects that you should report to your doctor or health care professional as soon as possible:    breast tenderness or discharge    pain in the abdomen, chest, groin or leg    severe headache    skin rash, itching, or hives    sudden shortness of breath    unusually weak or tired    vision or speech problems    yellowing of skin or eyes  Side effects that usually do not require medical attention (report to your doctor or health care professional if they continue or are bothersome):    changes in sexual desire    change in menstrual flow    facial hair growth    fluid retention and swelling    headache    irritability    nausea    weight gain or loss  What may interact with this medicine?  Do not take this medicine with any of the following medications:    amprenavir or fosamprenavir    bosentan  This medicine may also  interact with the following medications:    antibiotics or medicines for infections, especially rifampin, rifabutin, rifapentine, and griseofulvin, and possibly penicillins or tetracyclines    aprepitant    barbiturate medicines, such as phenobarbital    carbamazepine    felbamate    modafinil    oxcarbazepine    phenytoin    ritonavir or other medicines for HIV infection or AIDS    Ban's wort    topiramate  What if I miss a dose?  Try not to miss a dose. Every time you miss a dose or take a dose late your chance of pregnancy increases. When 1 pill is missed (even if only 3 hours late), take the missed pill as soon as possible and continue taking a pill each day at the regular time (use a back up method of birth control for the next 48 hours). If more than 1 dose is missed, use an additional birth control method for the rest of your pill pack until menses occurs. Contact your health care professional if more than 1 dose has been missed.  Where should I keep my medicine?  Keep out of the reach of children.  Store at room temperature between 15 and 30 degrees C (59 and 86 degrees F). Throw away any unused medicine after the expiration date.  What should I tell my health care provider before I take this medicine?  They need to know if you have any of these conditions:    blood vessel disease or blood clots    breast, cervical, or vaginal cancer    diabetes    heart disease    kidney disease    liver disease    mental depression    migraine    seizures    stroke    vaginal bleeding    an unusual or allergic reaction to norethindrone, other medicines, foods, dyes, or preservatives    pregnant or trying to get pregnant    breast-feeding  What should I watch for while using this medicine?  Visit your doctor or health care professional for regular checks on your progress. You will need a regular breast and pelvic exam and Pap smear while on this medicine.  Use an additional method of birth control during the first  cycle that you take these tablets.  If you have any reason to think you are pregnant, stop taking this medicine right away and contact your doctor or health care professional.  If you are taking this medicine for hormone related problems, it may take several cycles of use to see improvement in your condition.  This medicine does not protect you against HIV infection (AIDS) or any other sexually transmitted diseases.  NOTE:This sheet is a summary. It may not cover all possible information. If you have questions about this medicine, talk to your doctor, pharmacist, or health care provider. Copyright  2020 [employer]           Patient Education     Levonorgestrel intrauterine device (IUD)  Brand Names: Kyleena, LILETTA, Mirena, Milady  What is this medicine?  LEVONORGESTREL IUD (TONO voe nor stephanie trel) is a contraceptive (birth control) device. The device is placed inside the uterus by a healthcare professional. It is used to prevent pregnancy. This device can also be used to treat heavy bleeding that occurs during your period.  How should I use this medicine?  This device is placed inside the uterus by a health care professional.  Talk to your pediatrician regarding the use of this medicine in children. Special care may be needed.  What side effects may I notice from receiving this medicine?  Side effects that you should report to your doctor or health care professional as soon as possible:    allergic reactions like skin rash, itching or hives, swelling of the face, lips, or tongue    fever, flu-like symptoms    genital sores    high blood pressure    no menstrual period for 6 weeks during use    pain, swelling, warmth in the leg    pelvic pain or tenderness    severe or sudden headache    signs of pregnancy    stomach cramping    sudden shortness of breath    trouble with balance, talking, or walking    unusual vaginal bleeding, discharge    yellowing of the eyes or skin  Side effects that usually do not require medical  attention (report to your doctor or health care professional if they continue or are bothersome):    acne    breast pain    change in sex drive or performance    changes in weight    cramping, dizziness, or faintness while the device is being inserted    headache    irregular menstrual bleeding within first 3 to 6 months of use    nausea  What may interact with this medicine?  Do not take this medicine with any of the following medications:    amprenavir    bosentan    fosamprenavir  This medicine may also interact with the following medications:    aprepitant    armodafinil    barbiturate medicines for inducing sleep or treating seizures    bexarotene    boceprevir    griseofulvin    medicines to treat seizures like carbamazepine, ethotoin, felbamate, oxcarbazepine, phenytoin, topiramate    modafinil    pioglitazone    rifabutin    rifampin    rifapentine    some medicines to treat HIV infection like atazanavir, efavirenz, indinavir, lopinavir, nelfinavir, tipranavir, ritonavir    [place]'s wort    warfarin  What if I miss a dose?  This does not apply. Depending on the brand of device you have inserted, the device will need to be replaced every 3 to 6 years if you wish to continue using this type of birth control.  Where should I keep my medicine?  This does not apply.  What should I tell my health care provider before I take this medicine?  They need to know if you have any of these conditions:    abnormal Pap smear    cancer of the breast, uterus, or cervix    diabetes    endometritis    genital or pelvic infection now or in the past    have more than one sexual partner or your partner has more than one partner    heart disease    history of an ectopic or tubal pregnancy    immune system problems    IUD in place    liver disease or tumor    problems with blood clots or take blood-thinners    seizures    use intravenous drugs    uterus of unusual shape    vaginal bleeding that has not been explained    an unusual  or allergic reaction to levonorgestrel, other hormones, silicone, or polyethylene, medicines, foods, dyes, or preservatives    pregnant or trying to get pregnant    breast-feeding  What should I watch for while using this medicine?  Visit your doctor or health care professional for regular check ups. See your doctor if you or your partner has sexual contact with others, becomes HIV positive, or gets a sexual transmitted disease.  This product does not protect you against HIV infection (AIDS) or other sexually transmitted diseases.  You can check the placement of the IUD yourself by reaching up to the top of your vagina with clean fingers to feel the threads. Do not pull on the threads. It is a good habit to check placement after each menstrual period. Call your doctor right away if you feel more of the IUD than just the threads or if you cannot feel the threads at all.  The IUD may come out by itself. You may become pregnant if the device comes out. If you notice that the IUD has come out use a backup birth control method like condoms and call your health care provider.  Using tampons will not change the position of the IUD and are okay to use during your period.  This IUD can be safely scanned with magnetic resonance imaging (MRI) only under specific conditions. Before you have an MRI, tell your healthcare provider that you have an IUD in place, and which type of IUD you have in place.  NOTE:This sheet is a summary. It may not cover all possible information. If you have questions about this medicine, talk to your doctor, pharmacist, or health care provider. Copyright  2020 [employer]           Patient Education     Birth Control Methods  Birth control methods are used to help prevent pregnancy. There are many different methods to choose from. Talk to your healthcare provider about which method is right for you. Be sure to ask your provider about the effectiveness of each method. Also ask about the benefits, risks,  and side effects of each method.  Hormones  Some birth control methods work by releasing hormones such as progestin and estrogen. These methods include hormone implants, hormone shots, the vaginal ring, the patch, and birth control pills. They all work by stopping ovulation (release of the egg from the ovary). The implant is a small device that needs to be placed in the upper arm by a trained healthcare provider. It works for up to 3 years. Hormone injections must be repeated every 3 months. The vaginal ring must be replaced monthly (it can be removed during the fourth week of each cycle). The patch must be replaced weekly (it is not worn during the fourth week of each cycle). Birth control pills must be taken every day. All of these methods are effective and can be stopped at any time.  Intrauterine device (IUD)  An IUD is a small, T-shaped device. It must be placed in the uterus by a trained healthcare provider. There are different types of IUDs available. They work by causing changes in the uterus that make it harder for sperm to reach the egg. Depending on the type of IUD you have, it may work for several years or longer. The IUD is a reversible birth control method. This means it can be removed at any time.  Condom  A condom is a sheath that forms a thin barrier between the penis and the vagina. It helps prevent pregnancy by keeping sperm from entering the vagina. When latex condoms are used, they have the added benefit of protecting against most STIs (sexually transmitted infections). Condoms are used each time there is sexual intercourse and should be discarded after each use. Ask your healthcare provider about the different types of condoms available. These include both the male condom and female condom.  Spermicide  Spermicides come as foams, jellies, creams, suppositories, and tablets.  They help prevent pregnancy by killing sperm. When used alone they are not that reliable. They work best when combined  with other birth control methods such as diaphragms and cervical caps.  Sponge, diaphragm, and cervical cap  All of these methods help prevent pregnancy by covering the opening of the uterus (cervix). This prevents sperm from passing through.  The sponge contains spermicide. It can be bought over the counter. The sponge must be left in place for at least 6 hours after the last time you have sex. However, it should not stay in place for more than 24 hours. It should be discarded after it is used.  The diaphragm and cervical cap must be fitted and prescribed by your healthcare provider. Both are used with spermicide. The diaphragm must be left in place for at least 6 hours after sex. However, it should not stay in place for more than 24 hours. It can be washed and reused. The cervical cap must be left in place for at least 6 hours after sex. However, it should not stay in place for more than 48 hours. It can be washed and reused.  Withdrawal method  This is when the man pulls his penis out of the vagina just before ejaculation ( coming ). This lowers the amount of sperm entering the vagina. Be aware that fluids released just before ejaculation often still contain some sperm, so this method is not as reliable as certain other methods.  Rhythm method  This method requires that you know when in your menstrual cycle you are likely to become pregnant. Then, you avoid sex during those days. This requires careful planning and good discipline. Your healthcare provider can explain more about how this works.  Tubal ligation and vasectomy  These are surgical methods to prevent pregnancy. Tubal ligation is an option for women. The fallopian tubes are blocked or cut (ligated). This keeps the egg from passing into the uterus or sperm from reaching the egg. Vasectomy is an option for men. The tubes that normally carry sperm to the penis are either closed or blocked. Both tubal ligation and vasectomy are permanent birth control  methods. This means reversal is either not possible or unlikely to work. They are good choices for women and men who know that they do not want to have children in the future.  [employer] last reviewed this educational content on 2017-2020 [employer]. [address] [place] [address]. All rights reserved. This information is not intended as a substitute for professional medical care. Always follow your healthcare professional's instructions.           Patient Education     Tubal Sterilization Surgery    Tubal sterilization is one of the most effective forms of birth control. It blocks the egg from being fertilized by sperm. This prevents pregnancy. The surgery can be an outpatient procedure or done after the birth of a child. It can be done at the time of delivery if you are having a  section.   Making the decision  If you have tubal sterilization, you most likely will never get pregnant again. Be sure that s what you want. Talk it over with your healthcare provider and your partner. Surgery to undo tubal sterilization is complicated. It is costly. And it is not always successful. So, think of tubal sterilization as a lifelong birth control choice.   Closing the tubes  Your healthcare provider will choose the best way to block your tubes. Tubes may be closed with heat (cauterization). They may be closed with a clip or ring. Or they may be tied off and cut.  Your surgery  Your healthcare provider will tell you your choices for how the surgery will be done. There are several choices for surgical sterilization. Your healthcare provider will also discuss with you the complete removal of the tubes as an added benefit to decreasing ovarian cancer risk.     Possible laparoscopy incision sites          Minilaparotomy incision sites       Laparoscopy  This surgery is done without a hospital stay. For the procedure, a laparoscope is used. This is a thin tube with a camera and a light  on the end. The healthcare provider makes 1 to 2 small incisions in the stomach. The scope is put through 1 of the incisions. The scope sends live pictures of your fallopian tubes to a video screen. Surgical tools are placed through the other small incisions. Using the live images, the healthcare provider blocks your tubes. All tools are removed. The incisions are then closed with sutures or staples.   Minilaparotomy  A small incision is made near the navel or at the pubic hairline. This surgery is often done right after childbirth. An incision is made just underneath the belly button. Just after childbirth your uterus is enlarged, so it is easier to locate the tubes from an incision near the navel than from an incision near the pubic bone. The healthcare provider works through this incision to block the tubes. The incision is then closed with sutures or staples. After a  delivery, the sterilization can be done through the existing incision.   Hysteroscopy   This surgery can be done in your healthcare provider's office with sedation to keep you comfortable. A speculum is placed in the vagina and a thin tube with a camera and a light on the end is passed through the cervix into the uterus. Small coils are then placed into the fallopian tubes. It takes 3 months for the tubes to form scars over the coils and block the passage of sperm. You will need to use another method of contraception during these 3 months. Then a test called an HSG (hysterosalpingogram) is done to confirm the tubes are blocked.  [employer] last reviewed this educational content on 2016-2020 [employer]. [address] [place] [address], [place] [address]. All rights reserved. This information is not intended as a substitute for professional medical care. Always follow your healthcare professional's instructions.           Patient Education     Discharge Instruction for Laparoscopic Fallopian Tube Ligation  Your doctor did a  sterilization procedure called laparoscopic fallopian tube ligation to prevent any future pregnancies. This is a permanent form of birth control. Several different methods of surgical sterilization can be used to block the fallopian tubes. They all stop the egg from entering the womb (uterus) and sperm from traveling up to fertilize the egg. After a laparoscopic procedure, the incisions on your abdomen may be sore. You may also have pain in your upper back or shoulders. This is from the gas used to enlarge the abdomen to let your doctor to see inside your pelvis and do the procedure. This pain usually goes away in a day or two.  Here's what you can do to speed your recovery after surgery.   Home care    Take it easy. Stay quiet and rest for 2 days.    Return to your normal activities after 48 hours. You may also return to work at that time.    Eat a normal diet.    If needed, take an over-the-counter pain reliever for pain.    Don't lift anything heavier than 10 pounds for 1 week after the procedure.    Don't drive for at least 24 hours after the procedure and until pain is minimal without taking opioids if prescribed    Don't have sex for 2 weeks after surgery.  Follow-up care  Make a follow-up appointment as directed by our staff.     When to call your healthcare provider  Call your healthcare provider right away if you have any of the following:    Fever above 100.4 F (38 C) or higher, or as directed by your healthcare provider    Chills    Dizziness or fainting    Abdominal pain and swelling that get worse    Nausea and vomiting    Signs of infection. These include drainage, pus, warmth, or redness at your incision site.    Sudden chest pain or shortness of breath    Inability to empty your bladder   [employer] last reviewed this educational content on 11/1/2017 2000-2020 [employer]. [address] [place] [address], [place] [address]. All rights reserved. This information is not intended as a substitute  for professional medical care. Always follow your healthcare professional's instructions.

## 2021-01-21 NOTE — PROGRESS NOTES
[facility]    CC: routine postpartum visit.    S:Linnea M Schirmer is a 35 year old  s/p RLTCS on 2020 who presents today for routine postpartum visit.  Patient reports she has been doing well.  She denies any pain currently.  She states that she had a month of lochia after delivery.  She discontinued breast feeding after one month.  She denies any issues with bowel or bladder function.  She has soft stools, but is concerned about a persistent external hemorrhoid that is difficult to keep clean after bowel movements.  She is formula feeding baby.  Baby is doing well.  She states her mood and sleep are both good.  She has not yet resumed intercourse.      She is interested in birth control.  Patient reports that she has used OCPs in the past with side effects of emotional irritation, low sex drive, and vaginal dryness.  She also has had the Copper IUD and had constant bleeding.  She states she does not do well with hormones.  It effects her headaches.        O:   Patient Vitals for the past 24 hrs:   BP Temp Temp src Pulse Weight   21 1557 (!) 127/92 97.5  F (36.4  C) Temporal 100 85.3 kg (188 lb)   ]  Exam:  General- awake, alert, answering questions appropriately, appears comfortable  CV- regular rate  Lung- breathing comfortably on room air  Abd- soft, non-tender, non-distended  Incision-pfannenstiel skin incision is well healed  Ext- bilateral lower extremities with no edema    A&P: Linnea M Schirmer is a 35 year old  s/p RLTCS on 2020 who presents today for routine postpartum visit.     (Z39.2) Routine postpartum follow-up  (primary encounter diagnosis)  Comment: Patient is doing well postpartum.  Normal recovery.    Plan: Return to normal activities without restrictions.     (K64.4) External hemorrhoids  Comment: Patient with persistent hemorrhoids that she finds are difficult to clean around.  Will continue to monitor and use preparation H for 4 more weeks.   If still not improved, will plan general surgery consult for removal.  Patient prefers to see providers in [place]  Plan: GENERAL SURG ADULT REFERRAL    (Z30.09) Birth control counseling  Comment: Reviewed birth control options with patient.  Options that are possibilities include Mirena IUD, POPs, or permanent sterilization.  She and her partner are not yet 100% certain of permanent sterilization, but are thinking about it.  Discussed laparoscopic tubal ligation vs. Vasectomy.  Patient will continue to consider options and let me know if she makes a decision.  Plan: Can fill pill via phone.  Patient instructed to make procedure visit if she desires IUD.  Will consider permanent sterilization.    Samanta Baxter MD

## 2021-01-22 ENCOUNTER — TELEPHONE (OUTPATIENT)
Dept: OBGYN | Facility: CLINIC | Age: 36
End: 2021-01-22

## 2021-01-22 DIAGNOSIS — K64.4 EXTERNAL HEMORRHOIDS: Primary | ICD-10-CM

## 2021-01-22 NOTE — TELEPHONE ENCOUNTER
Please ref this patient to [facility]. This is a diagnosis we no longer see at [facility].     Thanks,  Tori

## 2021-10-03 ENCOUNTER — HEALTH MAINTENANCE LETTER (OUTPATIENT)
Age: 36
End: 2021-10-03

## 2022-03-16 ENCOUNTER — OFFICE VISIT (OUTPATIENT)
Dept: INTERNAL MEDICINE | Facility: CLINIC | Age: 37
End: 2022-03-16
Payer: COMMERCIAL

## 2022-03-16 VITALS
HEIGHT: 68 IN | DIASTOLIC BLOOD PRESSURE: 84 MMHG | WEIGHT: 208.4 LBS | OXYGEN SATURATION: 97 % | SYSTOLIC BLOOD PRESSURE: 125 MMHG | RESPIRATION RATE: 20 BRPM | BODY MASS INDEX: 31.58 KG/M2 | HEART RATE: 107 BPM | TEMPERATURE: 98.7 F

## 2022-03-16 DIAGNOSIS — R51.9 FACIAL PAIN: ICD-10-CM

## 2022-03-16 DIAGNOSIS — G43.909 MIGRAINE WITHOUT STATUS MIGRAINOSUS, NOT INTRACTABLE, UNSPECIFIED MIGRAINE TYPE: ICD-10-CM

## 2022-03-16 DIAGNOSIS — R68.84 JAW PAIN: Primary | ICD-10-CM

## 2022-03-16 PROCEDURE — 99203 OFFICE O/P NEW LOW 30 MIN: CPT | Performed by: FAMILY MEDICINE

## 2022-03-16 RX ORDER — GABAPENTIN 100 MG/1
CAPSULE ORAL
Qty: 150 CAPSULE | Refills: 2 | Status: SHIPPED | OUTPATIENT
Start: 2022-03-16

## 2022-03-16 RX ORDER — SUMATRIPTAN 50 MG/1
50 TABLET, FILM COATED ORAL
Qty: 20 TABLET | Refills: 0 | Status: SHIPPED | OUTPATIENT
Start: 2022-03-16

## 2022-03-16 NOTE — PROGRESS NOTES
"  (R68.84) Jaw pain  (primary encounter diagnosis)  Comment:   It sounds like to some extent a dental problem or infection have been ruled out.  Consider a variation of trigeminal neuralgia although pain is not quite as brief and intense.  Patient is agreeable to a trial of gabapentin, having been on it before following shingles.  Second opinion from ENT.  Could consider neurology opinion.  Plan: gabapentin (NEURONTIN) 100 MG capsule,         Otolaryngology Referral            (R51.9) Facial pain  Comment: As above.  Plan: gabapentin (NEURONTIN) 100 MG capsule,         Otolaryngology Referral            (G43.776) Migraine without status migrainosus, not intractable, unspecified migraine type  Comment: A refill.  Plan: SUMAtriptan (IMITREX) 50 MG tablet                Subjective     Pain in area of right jaw.    HPI     36-year-old patient is a 3-month history of pain along the right side of her jaw and face.  Pain waxes and wanes.  Sometimes is quite intense and she describes it as occasionally sharp.  Has trouble sleeping due to this.  Does not really notice that anything triggers the pain.    She did see a dentist who did not feel there was a dental etiology.  Did not feel it was TMJ.  Eye doctor also prescribed her a course of clindamycin which was not helpful.    Patient does have a history of migraine and needs a refill.  Also had shingles and some neuralgia following.      Review of Systems     No fevers.  No malaise.  No head congestion or sore throat.  No cough.  No chest pain.  No numbness or weakness.  No rash.      Objective    /84 (BP Location: Left arm, Patient Position: Sitting, Cuff Size: Adult Large)   Pulse 107   Temp 98.7  F (37.1  C) (Tympanic)   Resp 20   Ht 1.727 m (5' 8\")   Wt 94.5 kg (208 lb 6.4 oz)   LMP  (LMP Unknown)   SpO2 97%   BMI 31.69 kg/m    There is no height or weight on file to calculate BMI.  Physical Exam     NAD.  Conjunctiva without redness.  Pharynx no " inflammation or swelling.  Dentition looks very good.  No obvious facial swelling or tenderness or redness.  Symmetrical.  No submandibular or cervical adenopathy.  Speech clear.

## 2022-03-20 ENCOUNTER — HEALTH MAINTENANCE LETTER (OUTPATIENT)
Age: 37
End: 2022-03-20

## 2022-09-10 ENCOUNTER — HEALTH MAINTENANCE LETTER (OUTPATIENT)
Age: 37
End: 2022-09-10

## 2023-04-30 ENCOUNTER — HEALTH MAINTENANCE LETTER (OUTPATIENT)
Age: 38
End: 2023-04-30

## 2024-07-07 ENCOUNTER — HEALTH MAINTENANCE LETTER (OUTPATIENT)
Age: 39
End: 2024-07-07

## 2024-08-14 ENCOUNTER — TRANSFERRED RECORDS (OUTPATIENT)
Dept: HEALTH INFORMATION MANAGEMENT | Facility: CLINIC | Age: 39
End: 2024-08-14

## 2024-08-27 ENCOUNTER — TRANSFERRED RECORDS (OUTPATIENT)
Dept: HEALTH INFORMATION MANAGEMENT | Facility: CLINIC | Age: 39
End: 2024-08-27

## 2024-08-27 ENCOUNTER — MEDICAL CORRESPONDENCE (OUTPATIENT)
Dept: HEALTH INFORMATION MANAGEMENT | Facility: CLINIC | Age: 39
End: 2024-08-27

## (undated) DEVICE — CATH TRAY FOLEY SURESTEP 16FR WDRAIN BAG STLK LATEX A300316A

## (undated) DEVICE — PACK C-SECTION LF PL15OTA83B

## (undated) DEVICE — SU MONOCRYL 4-0 PS-2 18" UND Y496G

## (undated) DEVICE — PREP CHLORAPREP 26ML TINTED ORANGE  260815

## (undated) DEVICE — SOL WATER IRRIG 1000ML BOTTLE 07139-09

## (undated) DEVICE — SU PLAIN 2-0 CT 27" 853H

## (undated) DEVICE — SOL NACL 0.9% IRRIG 1000ML BOTTLE 07138-09

## (undated) DEVICE — SUCTION CANISTER MEDIVAC LINER 1500ML W/LID 65651-515

## (undated) DEVICE — DRSG STERI STRIP 1/4X3" R1541

## (undated) DEVICE — STOCKING SLEEVE COMPRESSION CALF LG

## (undated) DEVICE — STRAP KNEE/BODY 31143004

## (undated) DEVICE — ESU GROUND PAD UNIVERSAL W/O CORD

## (undated) DEVICE — BASIN SET MAJOR

## (undated) DEVICE — ADH FLOSEAL W/HUMAN THROMBIN 5ML 1503350

## (undated) RX ORDER — FENTANYL CITRATE 50 UG/ML
INJECTION, SOLUTION INTRAMUSCULAR; INTRAVENOUS
Status: DISPENSED
Start: 2020-11-13

## (undated) RX ORDER — MORPHINE SULFATE 1 MG/ML
INJECTION, SOLUTION EPIDURAL; INTRATHECAL; INTRAVENOUS
Status: DISPENSED
Start: 2020-11-13

## (undated) RX ORDER — ONDANSETRON 2 MG/ML
INJECTION INTRAMUSCULAR; INTRAVENOUS
Status: DISPENSED
Start: 2020-11-13

## (undated) RX ORDER — OXYTOCIN/0.9 % SODIUM CHLORIDE 30/500 ML
PLASTIC BAG, INJECTION (ML) INTRAVENOUS
Status: DISPENSED
Start: 2020-11-13